# Patient Record
Sex: FEMALE | Race: WHITE
[De-identification: names, ages, dates, MRNs, and addresses within clinical notes are randomized per-mention and may not be internally consistent; named-entity substitution may affect disease eponyms.]

---

## 2018-02-01 ENCOUNTER — HOSPITAL ENCOUNTER (OUTPATIENT)
Dept: HOSPITAL 8 - CFH | Age: 63
Discharge: HOME | End: 2018-02-01
Attending: INTERNAL MEDICINE
Payer: COMMERCIAL

## 2018-02-01 DIAGNOSIS — D69.6: ICD-10-CM

## 2018-02-01 DIAGNOSIS — K50.818: ICD-10-CM

## 2018-02-01 DIAGNOSIS — B18.2: ICD-10-CM

## 2018-02-01 DIAGNOSIS — D50.0: ICD-10-CM

## 2018-02-01 DIAGNOSIS — R18.8: ICD-10-CM

## 2018-02-01 DIAGNOSIS — E03.9: ICD-10-CM

## 2018-02-01 DIAGNOSIS — I86.4: ICD-10-CM

## 2018-02-01 DIAGNOSIS — M81.0: ICD-10-CM

## 2018-02-01 DIAGNOSIS — R10.9: ICD-10-CM

## 2018-02-01 DIAGNOSIS — K80.20: ICD-10-CM

## 2018-02-01 DIAGNOSIS — E89.40: ICD-10-CM

## 2018-02-01 DIAGNOSIS — I27.20: ICD-10-CM

## 2018-02-01 DIAGNOSIS — Z86.010: ICD-10-CM

## 2018-02-01 DIAGNOSIS — Z13.820: Primary | ICD-10-CM

## 2018-02-01 DIAGNOSIS — K74.69: ICD-10-CM

## 2018-02-01 DIAGNOSIS — I85.00: ICD-10-CM

## 2018-02-01 PROCEDURE — 77080 DXA BONE DENSITY AXIAL: CPT

## 2018-02-08 ENCOUNTER — HOSPITAL ENCOUNTER (OUTPATIENT)
Dept: HOSPITAL 8 - STAR | Age: 63
Discharge: HOME | End: 2018-02-08
Attending: SURGERY
Payer: COMMERCIAL

## 2018-02-08 DIAGNOSIS — R94.31: ICD-10-CM

## 2018-02-08 DIAGNOSIS — Z01.818: Primary | ICD-10-CM

## 2018-02-08 LAB
ALBUMIN SERPL-MCNC: 3.3 G/DL (ref 3.4–5)
ALP SERPL-CCNC: 148 U/L (ref 45–117)
ALT SERPL-CCNC: 21 U/L (ref 12–78)
ANION GAP SERPL CALC-SCNC: 8 MMOL/L (ref 5–15)
BILIRUB SERPL-MCNC: 1.5 MG/DL (ref 0.2–1)
CALCIUM SERPL-MCNC: 8.4 MG/DL (ref 8.5–10.1)
CHLORIDE SERPL-SCNC: 108 MMOL/L (ref 98–107)
CREAT SERPL-MCNC: 0.79 MG/DL (ref 0.55–1.02)
PROT SERPL-MCNC: 6.4 G/DL (ref 6.4–8.2)

## 2018-02-08 PROCEDURE — 93005 ELECTROCARDIOGRAM TRACING: CPT

## 2018-02-08 PROCEDURE — 36415 COLL VENOUS BLD VENIPUNCTURE: CPT

## 2018-02-08 PROCEDURE — 80053 COMPREHEN METABOLIC PANEL: CPT

## 2018-02-14 ENCOUNTER — HOSPITAL ENCOUNTER (OUTPATIENT)
Dept: HOSPITAL 8 - OUT | Age: 63
Setting detail: OBSERVATION
LOS: 1 days | Discharge: HOME | End: 2018-02-15
Attending: SURGERY | Admitting: SURGERY
Payer: COMMERCIAL

## 2018-02-14 VITALS — WEIGHT: 185.19 LBS | BODY MASS INDEX: 31.62 KG/M2 | HEIGHT: 64 IN

## 2018-02-14 VITALS — DIASTOLIC BLOOD PRESSURE: 74 MMHG | SYSTOLIC BLOOD PRESSURE: 125 MMHG

## 2018-02-14 VITALS — SYSTOLIC BLOOD PRESSURE: 136 MMHG | DIASTOLIC BLOOD PRESSURE: 75 MMHG

## 2018-02-14 VITALS — DIASTOLIC BLOOD PRESSURE: 69 MMHG | SYSTOLIC BLOOD PRESSURE: 117 MMHG

## 2018-02-14 DIAGNOSIS — K74.60: ICD-10-CM

## 2018-02-14 DIAGNOSIS — D64.9: ICD-10-CM

## 2018-02-14 DIAGNOSIS — K80.10: Primary | ICD-10-CM

## 2018-02-14 DIAGNOSIS — I27.20: ICD-10-CM

## 2018-02-14 PROCEDURE — 88304 TISSUE EXAM BY PATHOLOGIST: CPT

## 2018-02-14 PROCEDURE — 47562 LAPAROSCOPIC CHOLECYSTECTOMY: CPT

## 2018-02-14 PROCEDURE — G0378 HOSPITAL OBSERVATION PER HR: HCPCS

## 2018-02-14 PROCEDURE — S0028 INJECTION, FAMOTIDINE, 20 MG: HCPCS

## 2018-02-14 PROCEDURE — 96374 THER/PROPH/DIAG INJ IV PUSH: CPT

## 2018-02-14 RX ADMIN — HYDROMORPHONE HYDROCHLORIDE PRN MG: 1 INJECTION, SOLUTION INTRAMUSCULAR; INTRAVENOUS; SUBCUTANEOUS at 16:48

## 2018-02-14 RX ADMIN — HYDROMORPHONE HYDROCHLORIDE PRN MG: 1 INJECTION, SOLUTION INTRAMUSCULAR; INTRAVENOUS; SUBCUTANEOUS at 16:32

## 2018-02-14 RX ADMIN — HYDROMORPHONE HYDROCHLORIDE PRN MG: 1 INJECTION, SOLUTION INTRAMUSCULAR; INTRAVENOUS; SUBCUTANEOUS at 17:10

## 2018-02-14 RX ADMIN — FENTANYL CITRATE PRN MCG: 50 INJECTION INTRAMUSCULAR; INTRAVENOUS at 16:21

## 2018-02-14 RX ADMIN — HYDROMORPHONE HYDROCHLORIDE PRN MG: 1 INJECTION, SOLUTION INTRAMUSCULAR; INTRAVENOUS; SUBCUTANEOUS at 17:19

## 2018-02-14 RX ADMIN — OXYCODONE HYDROCHLORIDE PRN MG: 5 SOLUTION ORAL at 19:32

## 2018-02-14 RX ADMIN — FENTANYL CITRATE PRN MCG: 50 INJECTION INTRAMUSCULAR; INTRAVENOUS at 16:15

## 2018-02-14 RX ADMIN — OXYCODONE HYDROCHLORIDE PRN MG: 5 SOLUTION ORAL at 23:41

## 2018-02-15 VITALS — SYSTOLIC BLOOD PRESSURE: 127 MMHG | DIASTOLIC BLOOD PRESSURE: 71 MMHG

## 2018-02-15 VITALS — DIASTOLIC BLOOD PRESSURE: 78 MMHG | SYSTOLIC BLOOD PRESSURE: 144 MMHG

## 2018-02-15 RX ADMIN — SODIUM CHLORIDE, SODIUM LACTATE, POTASSIUM CHLORIDE, AND CALCIUM CHLORIDE SCH MLS/HR: .6; .31; .03; .02 INJECTION, SOLUTION INTRAVENOUS at 08:02

## 2018-02-15 RX ADMIN — OXYCODONE HYDROCHLORIDE PRN MG: 5 SOLUTION ORAL at 14:13

## 2018-02-15 RX ADMIN — SODIUM CHLORIDE, SODIUM LACTATE, POTASSIUM CHLORIDE, AND CALCIUM CHLORIDE SCH MLS/HR: .6; .31; .03; .02 INJECTION, SOLUTION INTRAVENOUS at 00:02

## 2018-02-15 RX ADMIN — OXYCODONE HYDROCHLORIDE PRN MG: 5 SOLUTION ORAL at 10:22

## 2018-06-04 ENCOUNTER — HOSPITAL ENCOUNTER (OUTPATIENT)
Dept: RADIOLOGY | Facility: MEDICAL CENTER | Age: 63
End: 2018-06-04

## 2018-07-19 ENCOUNTER — HOSPITAL ENCOUNTER (OUTPATIENT)
Dept: HOSPITAL 8 - CFH | Age: 63
Discharge: HOME | End: 2018-07-19
Attending: INTERNAL MEDICINE
Payer: MEDICARE

## 2018-07-19 DIAGNOSIS — I10: ICD-10-CM

## 2018-07-19 DIAGNOSIS — I07.1: Primary | ICD-10-CM

## 2018-07-19 DIAGNOSIS — I27.20: ICD-10-CM

## 2018-07-19 PROCEDURE — 93306 TTE W/DOPPLER COMPLETE: CPT

## 2018-08-21 ENCOUNTER — HOSPITAL ENCOUNTER (OUTPATIENT)
Dept: LAB | Facility: MEDICAL CENTER | Age: 63
End: 2018-08-21
Attending: INTERNAL MEDICINE
Payer: MEDICARE

## 2018-08-21 LAB
BUN SERPL-MCNC: 13 MG/DL (ref 8–22)
CREAT SERPL-MCNC: 0.99 MG/DL (ref 0.5–1.4)

## 2018-08-21 PROCEDURE — 36415 COLL VENOUS BLD VENIPUNCTURE: CPT

## 2018-08-21 PROCEDURE — 82565 ASSAY OF CREATININE: CPT

## 2018-08-21 PROCEDURE — 84520 ASSAY OF UREA NITROGEN: CPT

## 2018-08-23 ENCOUNTER — HOSPITAL ENCOUNTER (OUTPATIENT)
Dept: RADIOLOGY | Facility: MEDICAL CENTER | Age: 63
End: 2018-08-23
Attending: INTERNAL MEDICINE
Payer: MEDICARE

## 2018-08-23 DIAGNOSIS — K74.60 CIRRHOSIS OF LIVER WITHOUT ASCITES, UNSPECIFIED HEPATIC CIRRHOSIS TYPE (HCC): ICD-10-CM

## 2018-08-23 DIAGNOSIS — R93.3 GASTROINTESTINAL TRACT IMAGING ABNORMALITY: ICD-10-CM

## 2018-08-23 PROCEDURE — A9585 GADOBUTROL INJECTION: HCPCS | Performed by: INTERNAL MEDICINE

## 2018-08-23 PROCEDURE — 700117 HCHG RX CONTRAST REV CODE 255: Performed by: INTERNAL MEDICINE

## 2018-08-23 PROCEDURE — 74183 MRI ABD W/O CNTR FLWD CNTR: CPT

## 2018-08-23 RX ORDER — GADOBUTROL 604.72 MG/ML
7.5 INJECTION INTRAVENOUS ONCE
Status: COMPLETED | OUTPATIENT
Start: 2018-08-23 | End: 2018-08-23

## 2018-08-23 RX ADMIN — GADOBUTROL 7.5 ML: 604.72 INJECTION INTRAVENOUS at 10:00

## 2018-11-15 ENCOUNTER — HOSPITAL ENCOUNTER (OUTPATIENT)
Dept: LAB | Facility: MEDICAL CENTER | Age: 63
End: 2018-11-15
Attending: INTERNAL MEDICINE
Payer: MEDICARE

## 2018-11-15 LAB
BUN SERPL-MCNC: 11 MG/DL (ref 8–22)
CREAT SERPL-MCNC: 0.82 MG/DL (ref 0.5–1.4)

## 2018-11-15 PROCEDURE — 84520 ASSAY OF UREA NITROGEN: CPT

## 2018-11-15 PROCEDURE — 36415 COLL VENOUS BLD VENIPUNCTURE: CPT

## 2018-11-15 PROCEDURE — 82565 ASSAY OF CREATININE: CPT

## 2018-11-26 ENCOUNTER — HOSPITAL ENCOUNTER (OUTPATIENT)
Dept: RADIOLOGY | Facility: MEDICAL CENTER | Age: 63
End: 2018-11-26
Attending: INTERNAL MEDICINE
Payer: MEDICARE

## 2018-11-26 DIAGNOSIS — R93.3 ABNORMAL UPPER GASTROINTESTINAL BARIUM SERIES: ICD-10-CM

## 2018-11-26 PROCEDURE — 700117 HCHG RX CONTRAST REV CODE 255: Performed by: INTERNAL MEDICINE

## 2018-11-26 PROCEDURE — A9585 GADOBUTROL INJECTION: HCPCS | Performed by: INTERNAL MEDICINE

## 2018-11-26 PROCEDURE — 74183 MRI ABD W/O CNTR FLWD CNTR: CPT

## 2018-11-26 RX ORDER — GADOBUTROL 604.72 MG/ML
9 INJECTION INTRAVENOUS ONCE
Status: COMPLETED | OUTPATIENT
Start: 2018-11-26 | End: 2018-11-26

## 2018-11-26 RX ADMIN — GADOBUTROL 9 ML: 604.72 INJECTION INTRAVENOUS at 09:11

## 2019-01-10 ENCOUNTER — TELEPHONE (OUTPATIENT)
Dept: TRANSPLANT | Facility: MEDICAL CENTER | Age: 64
End: 2019-01-10

## 2019-01-10 ENCOUNTER — HOSPITAL ENCOUNTER (OUTPATIENT)
Dept: LAB | Facility: MEDICAL CENTER | Age: 64
End: 2019-01-10
Attending: INTERNAL MEDICINE
Payer: MEDICARE

## 2019-01-10 DIAGNOSIS — B18.2 CHRONIC HEPATITIS C WITHOUT HEPATIC COMA (HCC): ICD-10-CM

## 2019-01-10 DIAGNOSIS — B18.2 CHRONIC HEPATITIS C WITHOUT HEPATIC COMA (HCC): Primary | ICD-10-CM

## 2019-01-10 LAB
ALBUMIN SERPL BCP-MCNC: 3.7 G/DL (ref 3.2–4.9)
ALBUMIN/GLOB SERPL: 1.7 G/DL
ALP SERPL-CCNC: 95 U/L (ref 30–99)
ALT SERPL-CCNC: 22 U/L (ref 2–50)
ANION GAP SERPL CALC-SCNC: 8 MMOL/L (ref 0–11.9)
AST SERPL-CCNC: 27 U/L (ref 12–45)
BASOPHILS # BLD AUTO: 0.9 % (ref 0–1.8)
BASOPHILS # BLD: 0.03 K/UL (ref 0–0.12)
BILIRUB SERPL-MCNC: 2.7 MG/DL (ref 0.1–1.5)
BUN SERPL-MCNC: 6 MG/DL (ref 8–22)
CALCIUM SERPL-MCNC: 9.3 MG/DL (ref 8.5–10.5)
CHLORIDE SERPL-SCNC: 111 MMOL/L (ref 96–112)
CO2 SERPL-SCNC: 24 MMOL/L (ref 20–33)
CREAT SERPL-MCNC: 0.72 MG/DL (ref 0.5–1.4)
EOSINOPHIL # BLD AUTO: 0.03 K/UL (ref 0–0.51)
EOSINOPHIL NFR BLD: 0.9 % (ref 0–6.9)
ERYTHROCYTE [DISTWIDTH] IN BLOOD BY AUTOMATED COUNT: 45.1 FL (ref 35.9–50)
GLOBULIN SER CALC-MCNC: 2.2 G/DL (ref 1.9–3.5)
GLUCOSE SERPL-MCNC: 101 MG/DL (ref 65–99)
HCT VFR BLD AUTO: 41.6 % (ref 37–47)
HGB BLD-MCNC: 14 G/DL (ref 12–16)
IMM GRANULOCYTES # BLD AUTO: 0.01 K/UL (ref 0–0.11)
IMM GRANULOCYTES NFR BLD AUTO: 0.3 % (ref 0–0.9)
INR PPP: 1.65 (ref 0.87–1.13)
LYMPHOCYTES # BLD AUTO: 0.41 K/UL (ref 1–4.8)
LYMPHOCYTES NFR BLD: 11.7 % (ref 22–41)
MCH RBC QN AUTO: 29.7 PG (ref 27–33)
MCHC RBC AUTO-ENTMCNC: 33.7 G/DL (ref 33.6–35)
MCV RBC AUTO: 88.3 FL (ref 81.4–97.8)
MONOCYTES # BLD AUTO: 0.36 K/UL (ref 0–0.85)
MONOCYTES NFR BLD AUTO: 10.3 % (ref 0–13.4)
NEUTROPHILS # BLD AUTO: 2.67 K/UL (ref 2–7.15)
NEUTROPHILS NFR BLD: 75.9 % (ref 44–72)
NRBC # BLD AUTO: 0 K/UL
NRBC BLD-RTO: 0 /100 WBC
PLATELET # BLD AUTO: 59 K/UL (ref 164–446)
PMV BLD AUTO: 12.9 FL (ref 9–12.9)
POTASSIUM SERPL-SCNC: 3.5 MMOL/L (ref 3.6–5.5)
PROT SERPL-MCNC: 5.9 G/DL (ref 6–8.2)
PROTHROMBIN TIME: 19.6 SEC (ref 12–14.6)
RBC # BLD AUTO: 4.71 M/UL (ref 4.2–5.4)
SODIUM SERPL-SCNC: 143 MMOL/L (ref 135–145)
WBC # BLD AUTO: 3.5 K/UL (ref 4.8–10.8)

## 2019-01-10 PROCEDURE — 85025 COMPLETE CBC W/AUTO DIFF WBC: CPT

## 2019-01-10 PROCEDURE — 36415 COLL VENOUS BLD VENIPUNCTURE: CPT

## 2019-01-10 PROCEDURE — 82105 ALPHA-FETOPROTEIN SERUM: CPT

## 2019-01-10 PROCEDURE — 85610 PROTHROMBIN TIME: CPT

## 2019-01-10 PROCEDURE — 80053 COMPREHEN METABOLIC PANEL: CPT

## 2019-01-10 PROCEDURE — 80307 DRUG TEST PRSMV CHEM ANLYZR: CPT | Mod: GA

## 2019-01-10 NOTE — TELEPHONE ENCOUNTER
Called patient to discuss recent follow-up visits with community providers. Records requested from [facility] regarding recent notes and testing not done at [facility]. Records received from April with [facility] via fax.

## 2019-01-12 LAB
AMPHETAMINES UR QL: NEGATIVE NG/ML
BARBITURATES UR QL: NEGATIVE NG/ML
BENZODIAZ UR QL: NEGATIVE NG/ML
CANNABINOIDS UR QL SCN: NEGATIVE NG/ML
COCAINE UR QL: NEGATIVE NG/ML
DRUG SCREEN COMMENT UR-IMP: NORMAL
MDMA CTO UR SCN-MCNC: NEGATIVE NG/ML
METHADONE UR QL: NEGATIVE NG/ML
OPIATES UR QL: NEGATIVE NG/ML
OXYCODONE CTO UR SCN-MCNC: NEGATIVE NG/ML
PCP UR QL SCN: NEGATIVE NG/ML
PROPOXYPH UR QL: NEGATIVE NG/ML

## 2019-01-13 LAB — AFP-TM SERPL-MCNC: 4 NG/ML (ref 0–9)

## 2019-01-16 ENCOUNTER — OFFICE VISIT (OUTPATIENT)
Dept: TRANSPLANT | Facility: MEDICAL CENTER | Age: 64
End: 2019-01-16
Payer: MEDICARE

## 2019-01-16 VITALS
HEART RATE: 64 BPM | WEIGHT: 165 LBS | SYSTOLIC BLOOD PRESSURE: 112 MMHG | RESPIRATION RATE: 16 BRPM | OXYGEN SATURATION: 97 % | TEMPERATURE: 98.6 F | BODY MASS INDEX: 28.17 KG/M2 | DIASTOLIC BLOOD PRESSURE: 58 MMHG | HEIGHT: 64 IN

## 2019-01-16 DIAGNOSIS — K52.9 IBD (INFLAMMATORY BOWEL DISEASE): ICD-10-CM

## 2019-01-16 DIAGNOSIS — I27.20 PORTOPULMONARY HYPERTENSION (HCC): ICD-10-CM

## 2019-01-16 DIAGNOSIS — K76.6 PORTOPULMONARY HYPERTENSION (HCC): ICD-10-CM

## 2019-01-16 DIAGNOSIS — K74.60 CIRRHOSIS OF LIVER WITHOUT ASCITES, UNSPECIFIED HEPATIC CIRRHOSIS TYPE (HCC): ICD-10-CM

## 2019-01-16 DIAGNOSIS — D69.6 THROMBOCYTOPENIA (HCC): ICD-10-CM

## 2019-01-16 PROCEDURE — 99214 OFFICE O/P EST MOD 30 MIN: CPT | Performed by: INTERNAL MEDICINE

## 2019-01-16 RX ORDER — POTASSIUM CITRATE 10 MEQ/1
20 TABLET, EXTENDED RELEASE ORAL DAILY
COMMUNITY
End: 2019-06-23

## 2019-01-16 RX ORDER — FUROSEMIDE 80 MG
80 TABLET ORAL DAILY
COMMUNITY
End: 2019-06-23

## 2019-01-16 NOTE — PROGRESS NOTES
"  Pt seen in f/u in liver transplant clinic for f/u. She was accompanied by her .  She has a hx of PH dx around , cirrhosis due to HCV (risk is blood transfusion) with SVR, and liver mass, initially concerned to be c/w HCC, but remains a Lirads3 lesion    Currently in deferral for ongoing w/u of her PH and determination if she is a candidate based on her PH    RHC:   PMHx  dx'd with PH in  - on macitentan since   Hx of HCV - SVR from Harvoni   HCV cirrhosis  Liver mass - lirads 3  Edema requires diuretics   miscarriage  Reports hx of \"crohn's disease\" in  - on Lialda - last Milmine by her report 3 ya  Hyperthyroid with goiter s/p radiation - now hypothyroid           PSHx   Lap choly 2018  Appendicitis  Hysterectomy 1993 for fibroids (Open)     Soc:  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  No hospitalizations, no GIB  Working 20hrs per week  Had been obese, up to 180lbs now 165    Vitals:    19 1118   BP: 112/58   Pulse: 64   Resp: 16   Temp: 37 °C (98.6 °F)   SpO2: 97%     GEN: chronically ill appearing, NAD, no icterus  HEENT: OP clear, no cervical DELTA, no thyromegally  Pulm:  Lungs Clear to auscultation BL  Heart:  RRR, no murmur, rubs or gallops  Abd: Liver edge felt at RCM firm, +splenomegally, no ascites  Ext: no edema  Skin:  No palmar erythema, no spider angiomata  Lower ext: no edema  Pscyh: normal mood and affect  Neuro: Alert and oriented x 3, normal speech, normal gait and station    Results for MAXIMINO TOPETE (MRN [id number]) as of 2019 16:51   Ref. Range 1/10/2019 15:02   WBC Latest Ref Range: 4.8 - 10.8 K/uL 3.5 (L)   RBC Latest Ref Range: 4.20 - 5.40 " M/uL 4.71   Hemoglobin Latest Ref Range: 12.0 - 16.0 g/dL 14.0   Hematocrit Latest Ref Range: 37.0 - 47.0 % 41.6   MCV Latest Ref Range: 81.4 - 97.8 fL 88.3   MCH Latest Ref Range: 27.0 - 33.0 pg 29.7   MCHC Latest Ref Range: 33.6 - 35.0 g/dL 33.7   RDW Latest Ref Range: 35.9 - 50.0 fL 45.1   Platelet Count Latest Ref Range: 164 - 446 K/uL 59 (L)   Results for MAXIMINO TOPETE (MRN [id number]) as of 1/16/2019 16:51   Ref. Range 1/10/2019 15:02   Sodium Latest Ref Range: 135 - 145 mmol/L 143   Potassium Latest Ref Range: 3.6 - 5.5 mmol/L 3.5 (L)   Chloride Latest Ref Range: 96 - 112 mmol/L 111   Co2 Latest Ref Range: 20 - 33 mmol/L 24   Anion Gap Latest Ref Range: 0.0 - 11.9  8.0   Glucose Latest Ref Range: 65 - 99 mg/dL 101 (H)   Bun Latest Ref Range: 8 - 22 mg/dL 6 (L)   Creatinine Latest Ref Range: 0.50 - 1.40 mg/dL 0.72   GFR If  Latest Ref Range: >60 mL/min/1.73 m 2 >60   GFR If Non  Latest Ref Range: >60 mL/min/1.73 m 2 >60   Calcium Latest Ref Range: 8.5 - 10.5 mg/dL 9.3   AST(SGOT) Latest Ref Range: 12 - 45 U/L 27   ALT(SGPT) Latest Ref Range: 2 - 50 U/L 22   Alkaline Phosphatase Latest Ref Range: 30 - 99 U/L 95   Total Bilirubin Latest Ref Range: 0.1 - 1.5 mg/dL 2.7 (H)   Albumin Latest Ref Range: 3.2 - 4.9 g/dL 3.7   Total Protein Latest Ref Range: 6.0 - 8.2 g/dL 5.9 (L)   Globulin Latest Ref Range: 1.9 - 3.5 g/dL 2.2   A-G Ratio Latest Units: g/dL 1.7     11/26/18 Impression MRI with contrast      1.  Stable 11 mm nonenhancing slightly diffusion intense mass within hepatic segment IVb    2.  Previously identified hepatic 6 lesion not confirmed and there is a cyst at the same location    3.  Cirrhosis with portal hypertension. Associated marked splenomegaly and dilation the portal vein    4.  Prior cholecystectomy. Mild, stable biliary dilation    5.  Pancreas divisum       RHC 10/11/18:   CO 4.53/ CI 2.82    PA 47/13 MPAP 30  PCWP 7  mRAP 4    ECHO  8/20/18  Interpretation Summary  Contrast injection was performed.  1) Bubble study performed at rest and with Valsalva is negative for intra-cardiac and intra-pulmonary shunts.  Moderately dilated RV with mild systolic dysfunction. RVFAC=29%. Moderate-moderate/severe TR. RVSP= 66mmHg.  RAP=5mmHg. Mild PA and PA branch dilatation. Mild RAOUL.  2) Normal LV size and systolic function. LVEF by MOD= 65%. Mild LVH. Mild MR.  3) No prior exam for comparison.    Impression:   HCV cirrhosis, compensated, with PH, since 2000, presumably in part due to portal HTN.   Her PH has been controlled for years, and when pt had a new liver mass, concerning for HCC she was referred for Eval. The lesion in question has been stable and is not likely an HCC.   Although pt does meet minimal listing criteria her PH places her at high risk for OLT. Additionally, it is concerning that her CO/CI has decreased since her last RHC 8 years ago.   Pt expresses wish to avoid procedures with high risk, if possible.     REC:  Have Dr. Payan of PH at [facility] d/w Dr. Gadiel Rosa ([address] [facility]) local cardiologist treating PH - concern is decreased CO/CI in pt undergoing OLT  - pt would like to avoid having to travel to [place].   Continue f/u with local GI for ongoing HCC screening and management  No medication changes    Pt remains in deferral    Lacey De La Vega

## 2019-01-17 NOTE — NON-PROVIDER
Pt seen in clinic. History and current medications reviewed. Vital signs assessed. Seen by MD. Recommendations noted.

## 2019-02-06 ENCOUNTER — TELEPHONE (OUTPATIENT)
Dept: TRANSPLANT | Facility: MEDICAL CENTER | Age: 64
End: 2019-02-06

## 2019-02-06 NOTE — TELEPHONE ENCOUNTER
Called patient to followup with current recommendations made by Dr. Payan. Pt states that she spoke with her yesterday and understands recommendations. Pt to followup with RN with any medication changes or procedures after they are done. Pt verbalized understanding.

## 2019-06-23 ENCOUNTER — APPOINTMENT (OUTPATIENT)
Dept: RADIOLOGY | Facility: MEDICAL CENTER | Age: 64
End: 2019-06-23
Attending: EMERGENCY MEDICINE
Payer: MEDICARE

## 2019-06-23 ENCOUNTER — HOSPITAL ENCOUNTER (OUTPATIENT)
Facility: MEDICAL CENTER | Age: 64
End: 2019-06-27
Attending: EMERGENCY MEDICINE | Admitting: INTERNAL MEDICINE
Payer: MEDICARE

## 2019-06-23 DIAGNOSIS — E03.9 HYPOTHYROIDISM, UNSPECIFIED TYPE: ICD-10-CM

## 2019-06-23 DIAGNOSIS — G47.00 INSOMNIA, UNSPECIFIED TYPE: ICD-10-CM

## 2019-06-23 DIAGNOSIS — F29 PSYCHOSIS, UNSPECIFIED PSYCHOSIS TYPE (HCC): ICD-10-CM

## 2019-06-23 PROBLEM — D72.819 LEUKOPENIA: Status: ACTIVE | Noted: 2019-06-23

## 2019-06-23 PROBLEM — R17 SERUM TOTAL BILIRUBIN ELEVATED: Status: ACTIVE | Noted: 2019-06-23

## 2019-06-23 PROBLEM — D69.6 THROMBOCYTOPENIA (HCC): Status: ACTIVE | Noted: 2019-06-23

## 2019-06-23 PROBLEM — R44.3 HALLUCINATIONS: Status: ACTIVE | Noted: 2019-06-23

## 2019-06-23 PROBLEM — E87.6 HYPOKALEMIA: Status: ACTIVE | Noted: 2019-06-23

## 2019-06-23 LAB
ALBUMIN SERPL BCP-MCNC: 3.8 G/DL (ref 3.2–4.9)
ALBUMIN/GLOB SERPL: 1.9 G/DL
ALP SERPL-CCNC: 99 U/L (ref 30–99)
ALT SERPL-CCNC: 22 U/L (ref 2–50)
AMMONIA PLAS-SCNC: 60 UMOL/L (ref 11–45)
AMPHET UR QL SCN: NEGATIVE
ANION GAP SERPL CALC-SCNC: 9 MMOL/L (ref 0–11.9)
APPEARANCE UR: ABNORMAL
AST SERPL-CCNC: 31 U/L (ref 12–45)
BACTERIA #/AREA URNS HPF: NEGATIVE /HPF
BARBITURATES UR QL SCN: NEGATIVE
BASOPHILS # BLD AUTO: 0.8 % (ref 0–1.8)
BASOPHILS # BLD: 0.03 K/UL (ref 0–0.12)
BENZODIAZ UR QL SCN: NEGATIVE
BILIRUB SERPL-MCNC: 1.9 MG/DL (ref 0.1–1.5)
BILIRUB UR QL STRIP.AUTO: NEGATIVE
BUN SERPL-MCNC: 10 MG/DL (ref 8–22)
BZE UR QL SCN: NEGATIVE
CALCIUM SERPL-MCNC: 9.5 MG/DL (ref 8.5–10.5)
CANNABINOIDS UR QL SCN: NEGATIVE
CAOX CRY #/AREA URNS HPF: NORMAL /HPF
CHLORIDE SERPL-SCNC: 112 MMOL/L (ref 96–112)
CO2 SERPL-SCNC: 23 MMOL/L (ref 20–33)
COLOR UR: YELLOW
CREAT SERPL-MCNC: 0.71 MG/DL (ref 0.5–1.4)
EOSINOPHIL # BLD AUTO: 0.06 K/UL (ref 0–0.51)
EOSINOPHIL NFR BLD: 1.6 % (ref 0–6.9)
EPI CELLS #/AREA URNS HPF: NEGATIVE /HPF
ERYTHROCYTE [DISTWIDTH] IN BLOOD BY AUTOMATED COUNT: 52.9 FL (ref 35.9–50)
ETHANOL BLD-MCNC: 0 G/DL
GLOBULIN SER CALC-MCNC: 2 G/DL (ref 1.9–3.5)
GLUCOSE SERPL-MCNC: 94 MG/DL (ref 65–99)
GLUCOSE UR STRIP.AUTO-MCNC: NEGATIVE MG/DL
HCT VFR BLD AUTO: 39.5 % (ref 37–47)
HGB BLD-MCNC: 12.3 G/DL (ref 12–16)
HYALINE CASTS #/AREA URNS LPF: NORMAL /LPF
IMM GRANULOCYTES # BLD AUTO: 0.01 K/UL (ref 0–0.11)
IMM GRANULOCYTES NFR BLD AUTO: 0.3 % (ref 0–0.9)
KETONES UR STRIP.AUTO-MCNC: NEGATIVE MG/DL
LEUKOCYTE ESTERASE UR QL STRIP.AUTO: ABNORMAL
LYMPHOCYTES # BLD AUTO: 0.59 K/UL (ref 1–4.8)
LYMPHOCYTES NFR BLD: 16 % (ref 22–41)
MCH RBC QN AUTO: 26.3 PG (ref 27–33)
MCHC RBC AUTO-ENTMCNC: 31.1 G/DL (ref 33.6–35)
MCV RBC AUTO: 84.6 FL (ref 81.4–97.8)
METHADONE UR QL SCN: NEGATIVE
MICRO URNS: ABNORMAL
MONOCYTES # BLD AUTO: 0.34 K/UL (ref 0–0.85)
MONOCYTES NFR BLD AUTO: 9.2 % (ref 0–13.4)
NEUTROPHILS # BLD AUTO: 2.66 K/UL (ref 2–7.15)
NEUTROPHILS NFR BLD: 72.1 % (ref 44–72)
NITRITE UR QL STRIP.AUTO: NEGATIVE
NRBC # BLD AUTO: 0 K/UL
NRBC BLD-RTO: 0 /100 WBC
OPIATES UR QL SCN: NEGATIVE
OXYCODONE UR QL SCN: NEGATIVE
PCP UR QL SCN: NEGATIVE
PH UR STRIP.AUTO: 5.5 [PH]
PLATELET # BLD AUTO: 57 K/UL (ref 164–446)
PMV BLD AUTO: 10.8 FL (ref 9–12.9)
POTASSIUM SERPL-SCNC: 3.4 MMOL/L (ref 3.6–5.5)
PROPOXYPH UR QL SCN: NEGATIVE
PROT SERPL-MCNC: 5.8 G/DL (ref 6–8.2)
PROT UR QL STRIP: NEGATIVE MG/DL
RBC # BLD AUTO: 4.67 M/UL (ref 4.2–5.4)
RBC # URNS HPF: NORMAL /HPF
RBC UR QL AUTO: NEGATIVE
SODIUM SERPL-SCNC: 144 MMOL/L (ref 135–145)
SP GR UR STRIP.AUTO: 1.02
TSH SERPL DL<=0.005 MIU/L-ACNC: 20.95 UIU/ML (ref 0.38–5.33)
UROBILINOGEN UR STRIP.AUTO-MCNC: 1 MG/DL
WBC # BLD AUTO: 3.7 K/UL (ref 4.8–10.8)
WBC #/AREA URNS HPF: NORMAL /HPF

## 2019-06-23 PROCEDURE — 84443 ASSAY THYROID STIM HORMONE: CPT

## 2019-06-23 PROCEDURE — 71045 X-RAY EXAM CHEST 1 VIEW: CPT

## 2019-06-23 PROCEDURE — A9270 NON-COVERED ITEM OR SERVICE: HCPCS | Performed by: INTERNAL MEDICINE

## 2019-06-23 PROCEDURE — 80307 DRUG TEST PRSMV CHEM ANLYZR: CPT

## 2019-06-23 PROCEDURE — G0378 HOSPITAL OBSERVATION PER HR: HCPCS

## 2019-06-23 PROCEDURE — 85025 COMPLETE CBC W/AUTO DIFF WBC: CPT

## 2019-06-23 PROCEDURE — 82140 ASSAY OF AMMONIA: CPT

## 2019-06-23 PROCEDURE — 93005 ELECTROCARDIOGRAM TRACING: CPT | Performed by: EMERGENCY MEDICINE

## 2019-06-23 PROCEDURE — 700102 HCHG RX REV CODE 250 W/ 637 OVERRIDE(OP): Performed by: EMERGENCY MEDICINE

## 2019-06-23 PROCEDURE — 99285 EMERGENCY DEPT VISIT HI MDM: CPT

## 2019-06-23 PROCEDURE — 81001 URINALYSIS AUTO W/SCOPE: CPT | Mod: XU

## 2019-06-23 PROCEDURE — 700102 HCHG RX REV CODE 250 W/ 637 OVERRIDE(OP): Performed by: INTERNAL MEDICINE

## 2019-06-23 PROCEDURE — 99220 PR INITIAL OBSERVATION CARE,LEVL III: CPT | Performed by: INTERNAL MEDICINE

## 2019-06-23 PROCEDURE — 80053 COMPREHEN METABOLIC PANEL: CPT

## 2019-06-23 PROCEDURE — A9270 NON-COVERED ITEM OR SERVICE: HCPCS | Performed by: EMERGENCY MEDICINE

## 2019-06-23 PROCEDURE — 70450 CT HEAD/BRAIN W/O DYE: CPT

## 2019-06-23 RX ORDER — TRAZODONE HYDROCHLORIDE 50 MG/1
50 TABLET ORAL
Status: DISCONTINUED | OUTPATIENT
Start: 2019-06-23 | End: 2019-06-24

## 2019-06-23 RX ORDER — LEVOTHYROXINE SODIUM 0.12 MG/1
125 TABLET ORAL
Status: DISCONTINUED | OUTPATIENT
Start: 2019-06-24 | End: 2019-06-27 | Stop reason: HOSPADM

## 2019-06-23 RX ORDER — ONDANSETRON 2 MG/ML
4 INJECTION INTRAMUSCULAR; INTRAVENOUS EVERY 4 HOURS PRN
Status: DISCONTINUED | OUTPATIENT
Start: 2019-06-23 | End: 2019-06-27 | Stop reason: HOSPADM

## 2019-06-23 RX ORDER — AMOXICILLIN 250 MG
2 CAPSULE ORAL 2 TIMES DAILY
Status: DISCONTINUED | OUTPATIENT
Start: 2019-06-23 | End: 2019-06-27 | Stop reason: HOSPADM

## 2019-06-23 RX ORDER — LEVOTHYROXINE SODIUM 0.03 MG/1
25 TABLET ORAL
COMMUNITY

## 2019-06-23 RX ORDER — ENALAPRILAT 1.25 MG/ML
1.25 INJECTION INTRAVENOUS EVERY 6 HOURS PRN
Status: DISCONTINUED | OUTPATIENT
Start: 2019-06-23 | End: 2019-06-27 | Stop reason: HOSPADM

## 2019-06-23 RX ORDER — POTASSIUM CHLORIDE 20 MEQ/1
40 TABLET, EXTENDED RELEASE ORAL 2 TIMES DAILY
Status: COMPLETED | OUTPATIENT
Start: 2019-06-23 | End: 2019-06-25

## 2019-06-23 RX ORDER — POLYETHYLENE GLYCOL 3350 17 G/17G
1 POWDER, FOR SOLUTION ORAL
Status: DISCONTINUED | OUTPATIENT
Start: 2019-06-23 | End: 2019-06-27 | Stop reason: HOSPADM

## 2019-06-23 RX ORDER — BISACODYL 10 MG
10 SUPPOSITORY, RECTAL RECTAL
Status: DISCONTINUED | OUTPATIENT
Start: 2019-06-23 | End: 2019-06-27 | Stop reason: HOSPADM

## 2019-06-23 RX ORDER — HYDROXYZINE PAMOATE 50 MG/1
50 CAPSULE ORAL EVERY 4 HOURS PRN
Status: ON HOLD | COMMUNITY
End: 2019-06-27

## 2019-06-23 RX ORDER — BISACODYL 10 MG
10 SUPPOSITORY, RECTAL RECTAL
COMMUNITY

## 2019-06-23 RX ORDER — QUETIAPINE FUMARATE 25 MG/1
25 TABLET, FILM COATED ORAL
Status: DISCONTINUED | OUTPATIENT
Start: 2019-06-23 | End: 2019-06-24

## 2019-06-23 RX ORDER — QUETIAPINE FUMARATE 25 MG/1
25 TABLET, FILM COATED ORAL
Status: ON HOLD | COMMUNITY
End: 2019-06-27

## 2019-06-23 RX ORDER — CLONIDINE HYDROCHLORIDE 0.1 MG/1
0.1 TABLET ORAL EVERY 6 HOURS PRN
COMMUNITY

## 2019-06-23 RX ORDER — LEVOTHYROXINE SODIUM 0.2 MG/1
200 TABLET ORAL ONCE
Status: COMPLETED | OUTPATIENT
Start: 2019-06-23 | End: 2019-06-23

## 2019-06-23 RX ORDER — ACETAMINOPHEN 325 MG/1
650 TABLET ORAL EVERY 6 HOURS PRN
Status: DISCONTINUED | OUTPATIENT
Start: 2019-06-23 | End: 2019-06-27 | Stop reason: HOSPADM

## 2019-06-23 RX ORDER — ONDANSETRON 4 MG/1
4 TABLET, ORALLY DISINTEGRATING ORAL EVERY 4 HOURS PRN
Status: DISCONTINUED | OUTPATIENT
Start: 2019-06-23 | End: 2019-06-27 | Stop reason: HOSPADM

## 2019-06-23 RX ORDER — LEVOTHYROXINE SODIUM 0.03 MG/1
25 TABLET ORAL
Status: DISCONTINUED | OUTPATIENT
Start: 2019-06-24 | End: 2019-06-23

## 2019-06-23 RX ORDER — IBUPROFEN 400 MG/1
400 TABLET ORAL EVERY 6 HOURS PRN
COMMUNITY

## 2019-06-23 RX ORDER — LEVOTHYROXINE SODIUM 0.1 MG/1
100 TABLET ORAL
COMMUNITY

## 2019-06-23 RX ORDER — TRAZODONE HYDROCHLORIDE 50 MG/1
50 TABLET ORAL
Status: ON HOLD | COMMUNITY
End: 2019-06-27

## 2019-06-23 RX ADMIN — QUETIAPINE FUMARATE 25 MG: 25 TABLET ORAL at 20:35

## 2019-06-23 RX ADMIN — POTASSIUM CHLORIDE 40 MEQ: 1500 TABLET, EXTENDED RELEASE ORAL at 21:50

## 2019-06-23 RX ADMIN — LEVOTHYROXINE SODIUM 200 MCG: 200 TABLET ORAL at 18:45

## 2019-06-23 ASSESSMENT — COGNITIVE AND FUNCTIONAL STATUS - GENERAL
CLIMB 3 TO 5 STEPS WITH RAILING: A LITTLE
SUGGESTED CMS G CODE MODIFIER DAILY ACTIVITY: CH
DAILY ACTIVITIY SCORE: 24
SUGGESTED CMS G CODE MODIFIER MOBILITY: CI
MOBILITY SCORE: 23

## 2019-06-23 ASSESSMENT — ENCOUNTER SYMPTOMS
PALPITATIONS: 0
LOSS OF CONSCIOUSNESS: 0
DEPRESSION: 0
FALLS: 0
DIARRHEA: 0
ABDOMINAL PAIN: 0
WEAKNESS: 0
CHILLS: 0
FEVER: 0
SHORTNESS OF BREATH: 0
HEADACHES: 0
CONSTIPATION: 0
SPUTUM PRODUCTION: 0
VOMITING: 0
MYALGIAS: 0
TINGLING: 0
COUGH: 0
STRIDOR: 0
DIZZINESS: 0
NAUSEA: 0

## 2019-06-23 ASSESSMENT — LIFESTYLE VARIABLES
EVER_SMOKED: NEVER
ALCOHOL_USE: NO

## 2019-06-23 ASSESSMENT — PATIENT HEALTH QUESTIONNAIRE - PHQ9
SUM OF ALL RESPONSES TO PHQ9 QUESTIONS 1 AND 2: 0
2. FEELING DOWN, DEPRESSED, IRRITABLE, OR HOPELESS: NOT AT ALL
1. LITTLE INTEREST OR PLEASURE IN DOING THINGS: NOT AT ALL

## 2019-06-23 NOTE — ED TRIAGE NOTES
Chief Complaint   Patient presents with   • Medical Clearance     from [facility]   • Unable to Sleep     [id number]     Pt bib ems from [facility] , here for medical clearance . Per report, pt only gets 1-2hours sleep a night [id number]. Pt experiencing hallucinations/psychosis.

## 2019-06-24 PROBLEM — K74.60 LIVER CIRRHOSIS (HCC): Status: ACTIVE | Noted: 2019-06-24

## 2019-06-24 PROBLEM — D61.818 PANCYTOPENIA (HCC): Status: ACTIVE | Noted: 2019-06-23

## 2019-06-24 PROBLEM — F32.3 SEVERE MAJOR DEPRESSION WITH PSYCHOTIC FEATURES (HCC): Status: ACTIVE | Noted: 2019-06-24

## 2019-06-24 LAB
ANION GAP SERPL CALC-SCNC: 6 MMOL/L (ref 0–11.9)
BUN SERPL-MCNC: 9 MG/DL (ref 8–22)
CALCIUM SERPL-MCNC: 8.5 MG/DL (ref 8.5–10.5)
CHLORIDE SERPL-SCNC: 115 MMOL/L (ref 96–112)
CO2 SERPL-SCNC: 23 MMOL/L (ref 20–33)
CREAT SERPL-MCNC: 0.66 MG/DL (ref 0.5–1.4)
ERYTHROCYTE [DISTWIDTH] IN BLOOD BY AUTOMATED COUNT: 50.6 FL (ref 35.9–50)
GLUCOSE SERPL-MCNC: 78 MG/DL (ref 65–99)
HCT VFR BLD AUTO: 34.6 % (ref 37–47)
HGB BLD-MCNC: 11.2 G/DL (ref 12–16)
MAGNESIUM SERPL-MCNC: 1.5 MG/DL (ref 1.5–2.5)
MCH RBC QN AUTO: 26.6 PG (ref 27–33)
MCHC RBC AUTO-ENTMCNC: 32.4 G/DL (ref 33.6–35)
MCV RBC AUTO: 82.2 FL (ref 81.4–97.8)
MORPHOLOGY BLD-IMP: NORMAL
PLATELET # BLD AUTO: 42 K/UL (ref 164–446)
PLATELETS.RETICULATED NFR BLD AUTO: 6.8 K/UL (ref 0.6–13.1)
POTASSIUM SERPL-SCNC: 3.5 MMOL/L (ref 3.6–5.5)
RBC # BLD AUTO: 4.21 M/UL (ref 4.2–5.4)
SODIUM SERPL-SCNC: 144 MMOL/L (ref 135–145)
WBC # BLD AUTO: 1.9 K/UL (ref 4.8–10.8)

## 2019-06-24 PROCEDURE — 99226 PR SUBSEQUENT OBSERVATION CARE,LEVEL III: CPT | Performed by: INTERNAL MEDICINE

## 2019-06-24 PROCEDURE — 85027 COMPLETE CBC AUTOMATED: CPT

## 2019-06-24 PROCEDURE — 83735 ASSAY OF MAGNESIUM: CPT

## 2019-06-24 PROCEDURE — A9270 NON-COVERED ITEM OR SERVICE: HCPCS | Performed by: PSYCHIATRY & NEUROLOGY

## 2019-06-24 PROCEDURE — 85055 RETICULATED PLATELET ASSAY: CPT

## 2019-06-24 PROCEDURE — 700102 HCHG RX REV CODE 250 W/ 637 OVERRIDE(OP): Performed by: INTERNAL MEDICINE

## 2019-06-24 PROCEDURE — A9270 NON-COVERED ITEM OR SERVICE: HCPCS | Performed by: INTERNAL MEDICINE

## 2019-06-24 PROCEDURE — 96372 THER/PROPH/DIAG INJ SC/IM: CPT

## 2019-06-24 PROCEDURE — 700102 HCHG RX REV CODE 250 W/ 637 OVERRIDE(OP): Performed by: PSYCHIATRY & NEUROLOGY

## 2019-06-24 PROCEDURE — 80048 BASIC METABOLIC PNL TOTAL CA: CPT

## 2019-06-24 PROCEDURE — 36415 COLL VENOUS BLD VENIPUNCTURE: CPT

## 2019-06-24 PROCEDURE — 700111 HCHG RX REV CODE 636 W/ 250 OVERRIDE (IP): Performed by: INTERNAL MEDICINE

## 2019-06-24 PROCEDURE — 99215 OFFICE O/P EST HI 40 MIN: CPT | Performed by: PSYCHIATRY & NEUROLOGY

## 2019-06-24 PROCEDURE — G0378 HOSPITAL OBSERVATION PER HR: HCPCS

## 2019-06-24 RX ORDER — ARIPIPRAZOLE 10 MG/1
5 TABLET ORAL
Status: DISCONTINUED | OUTPATIENT
Start: 2019-06-24 | End: 2019-06-26

## 2019-06-24 RX ORDER — QUETIAPINE FUMARATE 25 MG/1
50 TABLET, FILM COATED ORAL EVERY EVENING
Status: DISCONTINUED | OUTPATIENT
Start: 2019-06-24 | End: 2019-06-24

## 2019-06-24 RX ORDER — MIRTAZAPINE 15 MG/1
7.5 TABLET, FILM COATED ORAL
Status: DISCONTINUED | OUTPATIENT
Start: 2019-06-24 | End: 2019-06-27 | Stop reason: HOSPADM

## 2019-06-24 RX ORDER — QUETIAPINE FUMARATE 100 MG/1
100 TABLET, FILM COATED ORAL EVERY EVENING
Status: DISCONTINUED | OUTPATIENT
Start: 2019-06-24 | End: 2019-06-24

## 2019-06-24 RX ORDER — QUETIAPINE FUMARATE 25 MG/1
25 TABLET, FILM COATED ORAL 2 TIMES DAILY
Status: DISCONTINUED | OUTPATIENT
Start: 2019-06-24 | End: 2019-06-24

## 2019-06-24 RX ADMIN — SENNOSIDES, DOCUSATE SODIUM 2 TABLET: 50; 8.6 TABLET, FILM COATED ORAL at 06:21

## 2019-06-24 RX ADMIN — POTASSIUM CHLORIDE 40 MEQ: 1500 TABLET, EXTENDED RELEASE ORAL at 17:59

## 2019-06-24 RX ADMIN — ENOXAPARIN SODIUM 40 MG: 100 INJECTION SUBCUTANEOUS at 06:21

## 2019-06-24 RX ADMIN — ARIPIPRAZOLE 5 MG: 10 TABLET ORAL at 20:48

## 2019-06-24 RX ADMIN — SERTRALINE HYDROCHLORIDE 25 MG: 50 TABLET ORAL at 18:23

## 2019-06-24 RX ADMIN — LEVOTHYROXINE SODIUM 125 MCG: 125 TABLET ORAL at 06:21

## 2019-06-24 RX ADMIN — POTASSIUM CHLORIDE 40 MEQ: 1500 TABLET, EXTENDED RELEASE ORAL at 06:21

## 2019-06-24 ASSESSMENT — ENCOUNTER SYMPTOMS
CONSTITUTIONAL NEGATIVE: 1
NERVOUS/ANXIOUS: 1
EYES NEGATIVE: 1
HALLUCINATIONS: 1
MUSCULOSKELETAL NEGATIVE: 1
RESPIRATORY NEGATIVE: 1
DEPRESSION: 1
CARDIOVASCULAR NEGATIVE: 1
INSOMNIA: 1
NEUROLOGICAL NEGATIVE: 1
GASTROINTESTINAL NEGATIVE: 1

## 2019-06-24 NOTE — PSYCHIATRY
PSYCHIATRY NOTE: Consult received and assigned to Dr. Holland. Patient will be seen within the next 24 hours. Thank you.

## 2019-06-24 NOTE — ASSESSMENT & PLAN NOTE
-Markedly elevated TSH, due to noncompliance with Synthroid.  She is not clinically in myxedema.  -Continue Synthroid 100 mcg daily, which should give approximately 1.6 mcg/kg/day.  Needs repeat TSH in 4 to 6 weeks.

## 2019-06-24 NOTE — CARE PLAN
Problem: Psychosocial needs  Goal: Anxiety reduction    Intervention: Stimuli reduction, calming techniques  Lights turned low, noise minimized to relieve pt anxiety and promote rest. Pt calm, cooperative and able to rest comfortably.      Problem: Elimination  Goal: Regular urinary elimination  Outcome: MET Date Met: 06/24/19  Pt able to void independently with no assistance.

## 2019-06-24 NOTE — PSYCHIATRY
"PSYCHIATRIC INTAKE EVALUATION    *Reason for admission: pt was admitted at [facility] and transferred to [facility] for \"medical clearance\"                   *Reason for consult: hallucinations       *Requesting Physician/APN: LUCIA Payne MD     Legal Hold on admission:   NA         *Chief Complaint: \"consfused\"     *HPI (includes Psychiatric ROS): 63 yo female interviewed by self and then discussed with  with her permission,in front of her. The following is a summary between both. Pt has been under a lot of stress from medical and financial issues. She is an  and was working part time last year and earnings decreased. In addition, in 9/2018 she went to [place] where \"spots\" were found (though to be benign on MRI but are to be monitored q 3 months. There are also issues with dtr.  feels that she began getting depressed around the beginning of this year and then a few months ago, began hallucinating and getting delusional with thoughts that the is in trouble with the court for not tending to things ( says she is simply catching on on some cases and the court asked for an update), feeling like people are following her, the food \"might have something in it\", seeing animals and birds in the backyard (this is what prompted going to [facility]) which agitated her because there were \"so many\".    Pt says her mood, which she \"thinks\" is depression \"is closer to 10 (bad) than 1\". Her anxiety is the same. She hasn't slept in 2 months, doesn't know about her appetite but  says it is down. Decreased energy ( might be from stopping synthroid), ruminates about stressors, hopeless, and anhedonic. However denies SI or wishes to be dead. She says she has felt very confused.     Anxiety: as noted. This past weekend was \"one long panic attack\"  Anitra: denies  Psychosis: as noted.  Cognitive:  says he has not noticed any issues with memory. Pt says sometimes she has gotten lost but is not impaired when pushed.  She " "is sluggish in her movements and thinking.      *Medical Review Of Symptoms (not dx conditions): note:on ROS she denied everything but her  is the one who brought up the liver concerns which she agreed with. She is also Hep C + treated with harvoni prior to 9/2018. She was dx with pulmonary HTN 20 years ago as well. Pt acknowledged this to be true but had not brought it up.  Review of Systems   Constitutional: Negative.    HENT: Positive for tinnitus (B, x a few months during the day and when trying to sleep.).    Eyes: Negative.    Respiratory: Negative.    Cardiovascular: Negative.    Gastrointestinal: Negative.    Genitourinary: Negative.    Musculoskeletal: Negative.    Skin: Negative.    Neurological: Negative.    Endo/Heme/Allergies: Negative.    Psychiatric/Behavioral: Positive for depression and hallucinations. The patient is nervous/anxious and has insomnia.      *Psychiatric Examination:   Vitals:Blood pressure (!) 95/55, pulse 69, temperature 36.3 °C (97.3 °F), temperature source Temporal, resp. rate 16, height 1.626 m (5' 4\"), weight 66.5 kg (146 lb 9.7 oz), SpO2 91 %, not currently breastfeeding.  General Appearance: good eye contact, normal habitus? (covered up with blankets), cooperative to the degree able  Abnormal Movements:slowed  Gait and Posture:lyin in bed  Speech:slowed, soft  Thought Process: slowed  Associations:linear  Abnormal or Psychotic Thoughts: as noted above  Judgement and Insight: impaired.   Orientation:x 4 when pressed.  Recent and Remote Memory:grossly intact when pushed  Attention Span and Concentration: fair  Language:fluid  Fund of Knowledge: not tested   Mood and Affect: as noted above/blunted  SI/HI: denies     *PAST MEDICAL/PSYCH/FAMILY/SOCIAL(as reported by patient):       *medical hx:        TBI:denies  SZ:denies  Stroke: denies  Past Medical History:   Diagnosis Date   • Anemia    • Cirrhosis of liver with ascites (HCC)    • Crohn disease (HCC)    • Esophageal " varices (HCC)    • Hepatitis C 1980    treated sucessfully   • Hypothyroid    • Pulmonary hypertension (HCC)      Past Surgical History:   Procedure Laterality Date   • APPENDECTOMY  1970   • BOWEL RESECTION  1970    after appendectomy r/t ruptured appendix   • CHOLECYSTECTOMY     • HYSTERECTOMY RADICAL     • THYROIDECTOMY      by ablation   • ZZZ CARDIAC CATH       *psychiatric hx:   SAs:denies  Hospitalizations:denies: was just at [facility] and transferred.  Med Hx:none  Dx Hx:none    *family Psych hx:  Not assessed     *social hx: as noted.   Alcohol: denies  Drugs:denies     *MEDICAL HX: labs, MARS, medications, etc were reviewed. Only those findings of potential interest to psychiatry are noted below:    *Current Medical issues:   See under medical below     *Allergies:  No Known Allergies  *Current Medications:    Current Facility-Administered Medications:   •  sertraline (ZOLOFT) tablet 25 mg, 25 mg, Oral, DAILY, Charlotte Antonio M.D.  •  QUEtiapine (SEROQUEL) tablet 25 mg, 25 mg, Oral, BID, Charlotte Antonio M.D.  •  QUEtiapine (SEROQUEL) tablet 100 mg, 100 mg, Oral, Q EVENING, Charlotte Antonio M.D.  •  levothyroxine (SYNTHROID) tablet 125 mcg, 125 mcg, Oral, AM ES, ERMIAS Meyer.O., 125 mcg at 06/24/19 0621  •  QUEtiapine (SEROQUEL) tablet 25 mg, 25 mg, Oral, QHS, ERMIAS Meyer.O., 25 mg at 06/23/19 2035  •  traZODone (DESYREL) tablet 50 mg, 50 mg, Oral, QHS, ERMIAS Meyer.O.  •  senna-docusate (PERICOLACE or SENOKOT S) 8.6-50 MG per tablet 2 Tab, 2 Tab, Oral, BID, 2 Tab at 06/24/19 0621 **AND** polyethylene glycol/lytes (MIRALAX) PACKET 1 Packet, 1 Packet, Oral, QDAY PRN **AND** magnesium hydroxide (MILK OF MAGNESIA) suspension 30 mL, 30 mL, Oral, QDAY PRN **AND** bisacodyl (DULCOLAX) suppository 10 mg, 10 mg, Rectal, QDAY PRN, Lucian Rob D.O.  •  enoxaparin (LOVENOX) inj 40 mg, 40 mg, Subcutaneous, DAILY, Lucian Rob D.O., 40 mg at 06/24/19 0621  •  acetaminophen  (TYLENOL) tablet 650 mg, 650 mg, Oral, Q6HRS PRN, Lucian Rob D.O.  •  enalaprilat (VASOTEC) injection 1.25 mg, 1.25 mg, Intravenous, Q6HRS PRN, Lucian Rob D.O.  •  ondansetron (ZOFRAN) syringe/vial injection 4 mg, 4 mg, Intravenous, Q4HRS PRN, Lucian Rob D.O.  •  ondansetron (ZOFRAN ODT) dispertab 4 mg, 4 mg, Oral, Q4HRS PRN, Lucian Rob D.O.  •  potassium chloride SA (Kdur) tablet 40 mEq, 40 mEq, Oral, BID, Lucian Rob D.O., 40 mEq at 06/24/19 0621  *EKG: personally reviewed QTc: 469  *Imaging: personally reviewed CT: no significant findings.   EEG:  none     *Labs:  Recent Labs      06/23/19   1626  06/24/19   0426   WBC  3.7*  1.9*   RBC  4.67  4.21   HEMOGLOBIN  12.3  11.2*   HEMATOCRIT  39.5  34.6*   MCV  84.6  82.2   MCH  26.3*  26.6*   RDW  52.9*  50.6*   PLATELETCT  57*  42*   MPV  10.8   --    NEUTSPOLYS  72.10*   --    LYMPHOCYTES  16.00*   --    MONOCYTES  9.20   --    EOSINOPHILS  1.60   --    BASOPHILS  0.80   --    Results for MAXIMINO TOPETE (MRN [id number]) as of 6/24/2019 11:06: marginalization?   Ref. Range 6/24/2019 04:26   WBC Latest Ref Range: 4.8 - 10.8 K/uL 1.9 (LL)   Results for MAXIMINO TOPETE (MRN [id number]) as of 6/24/2019 11:06   Ref. Range 6/24/2019 04:26   Platelet Count Latest Ref Range: 164 - 446 K/uL 42 (LL)   Results for MAXIMINO TOPETE (MRN [id number]) as of 6/24/2019 11:06   Ref. Range 6/23/2019 16:26   Ammonia Latest Ref Range: 11 - 45 umol/L 60 (H)   Results for MAXIMINO TOPETE (MRN [id number]) as of 6/24/2019 11:06   Ref. Range 6/23/2019 16:26   A-G Ratio Latest Units: g/dL 1.9   Results for MAXIMINO TOPETE (MRN [id number]) as of 6/24/2019 11:06   Ref. Range 6/23/2019 16:26   TSH Latest Ref Range: 0.380 - 5.330 uIU/mL 20.950 (H)     Lab Results   Component Value Date/Time    SODIUM 144 06/24/2019 04:26 AM    POTASSIUM 3.5 (L) 06/24/2019 04:26 AM    CHLORIDE 115 (H) 06/24/2019 04:26 AM    CO2 23 06/24/2019 04:26 AM    GLUCOSE 78 06/24/2019 04:26 AM    BUN  9 06/24/2019 04:26 AM    CREATININE 0.66 06/24/2019 04:26 AM               Lab Results  Component Value Date/Time   AMPHUR Negative 06/23/2019 1945   BARBSURINE Negative 06/23/2019 1945   BENZODIAZU Negative 06/23/2019 1945   COCAINEMET Negative 06/23/2019 1945   METHADONE Negative 06/23/2019 1945   OPIATES Negative 06/23/2019 1945   OXYCODN Negative 06/23/2019 1945   PCPURINE Negative 06/23/2019 1945   PROPOXY Negative 06/23/2019 1945   CANNABINOID Negative 06/23/2019 1945     No results found for: TSH, FREET4      *ASSESSMENT/PLAN:  1. Major depression severe with anxiety and psychosis  - R/O adjustment disorder with anxiety, depression and psychosis  -R/O late onset schizophrenia: seems very unlikely  -R/O lewy body dementia: unlikely  -R/O secondary compromise by elevated NH3    -start zoloft for depression and anxiety, not liver cleared  -start remeron for  sleep.  -start abilify for psychosis  -prn ativan for anxiety  -dc trazodone    2.   Medical   -elevated NH3: 60  -hx of cirrhosis and ascitics  -Hep C treated with harvoni   -hypothyroidism: being replaced.     Legal hold: NA. Have requested records from [facility] as well through . Recommended food/fluid intake and SLEEP be monitored and documented in the chart.     *Will Follow  *Thank you for the consult

## 2019-06-24 NOTE — ASSESSMENT & PLAN NOTE
-Chronic, likely due to cirrhosis/portal hypertension. No sign of gross bleeding.  -Monitor closely.  Transfuse if platelet count drops below 10,000.  Monitor for bleeding.

## 2019-06-24 NOTE — PROGRESS NOTES
Received report, assumed pt care. Pt a&o x 4, VSS, Assessment completed. Resting comfortably in bed with call light, bedside table in reach. No c/o at this time. Side rails up x 2. Instructed to use call light when needing assistance, verbalized understanding. Bed alarm off, pt up self and steady,  bed in low position. Will continue to monitor.

## 2019-06-24 NOTE — DISCHARGE PLANNING
LSW contacted Medical Records at [facility] and requested records for Psychiatry.    Update: LSW received Medical Records from [facility] and placed in pt's chart.

## 2019-06-24 NOTE — ASSESSMENT & PLAN NOTE
-Chronic, no sign of infection.  Likely due to cirrhosis.  -Monitor blood counts closely.  Monitor for fevers.  Transfuse if hemoglobin drops below 7, and platelet level drops below 10,000.

## 2019-06-24 NOTE — CARE PLAN
Problem: Safety  Goal: Will remain free from falls    Intervention: Assess risk factors for falls  Fall measures in place. Call light within reach, personal belongings close-by, bed in lowest position, IV pole on same side of bathroom, upper bed rails up, hourly rounding in place. Will continue to monitor pt for safety.       Problem: Knowledge Deficit  Goal: Knowledge of disease process/condition, treatment plan, diagnostic tests, and medications will improve    Intervention: Assess knowledge level of disease process/condition, treatment plan, diagnostic tests, and medications  Discussed poc w/ pt and pt's  edu on treatment plan and discussed goals.

## 2019-06-24 NOTE — DISCHARGE PLANNING
Medical Social Work    MSW received a call from Stella with [facility] requesting an update on pt as they sent her over.  Stella was informed that pt was admitted to [facility] for observation.

## 2019-06-24 NOTE — ASSESSMENT & PLAN NOTE
-She follows up as outpatient, and was treated with Harvoni at [place].   -Ammonia level barely elevated, and maintaining mentation.  Monitor closely, repeat ammonia level if becomes confused/encephalopathic.

## 2019-06-24 NOTE — H&P
Hospital Medicine History & Physical Note    Date of Service  6/23/2019    Primary Care Physician  Gregorio Giraldo M.D.    Consultants  Psychiatry    Code Status  Full    Chief Complaint  Hallucinations    History of Presenting Illness  64 y.o. female who presented 6/23/2019 with hallucinations.  Patient initially went to [facility], they felt it was new onset psychosis, transferred here for clearance.  Patient upon arrival was noted to have a markedly elevated TSH.  Patient states she stopped taking her Synthroid about a month ago, she misplaced her prescription.  She did not attempt to get a new prescription but recently found her Synthroid and started taking it approximately 2 days ago.  She does complain of being cold all the time but that is her only complaint as far as hypothyroid goes.  Patient began having hallucinations approximately a month ago.  She sees a forest as well as strange creatures inside of it.  She is aware that it is not there but this continues to happen.  I did discuss the case including labs and imaging with the ER physician.    Review of Systems  Review of Systems   Constitutional: Positive for malaise/fatigue. Negative for chills and fever.        Cold    HENT: Negative for congestion.    Respiratory: Negative for cough, sputum production, shortness of breath and stridor.    Cardiovascular: Negative for chest pain, palpitations and leg swelling.   Gastrointestinal: Negative for abdominal pain, constipation, diarrhea, nausea and vomiting.   Genitourinary: Negative for dysuria and urgency.   Musculoskeletal: Negative for falls and myalgias.   Neurological: Negative for dizziness, tingling, loss of consciousness, weakness and headaches.   Psychiatric/Behavioral: Negative for depression and suicidal ideas.   All other systems reviewed and are negative.      Past Medical History   has a past medical history of Anemia; Cirrhosis of liver with ascites (HCC); Crohn disease  (HCC); Esophageal varices (HCC); Hepatitis C (1980); Hypothyroid; and Pulmonary hypertension (HCC).    Surgical History   has a past surgical history that includes zzz cardiac cath; appendectomy (1970); cholecystectomy; hysterectomy radical; thyroidectomy; and bowel resection (1970).     Family History  family history includes Diabetes in her mother; Heart Failure in her father; Leukemia in her mother.     Social History   reports that she has never smoked. She has never used smokeless tobacco. She reports that she does not drink alcohol or use drugs.    Allergies  No Known Allergies    Medications  Prior to Admission Medications   Prescriptions Last Dose Informant Patient Reported? Taking?   QUEtiapine (SEROQUEL) 25 MG Tab unknown at Unknown time  Yes Yes   Sig: Take 25 mg by mouth every bedtime.   bisacodyl (DULCOLAX) 10 MG Suppos unknwon at Unknown time  Yes Yes   Sig: Insert 10 mg in rectum 1 time daily as needed.   cloNIDine (CATAPRES) 0.1 MG Tab unknown at Unknown time  Yes Yes   Sig: Take 0.1 mg by mouth every 6 hours as needed (for bp).   hydrOXYzine pamoate (VISTARIL) 50 MG Cap unknown at Unknown time  Yes Yes   Sig: Take 50 mg by mouth every four hours as needed for Anxiety.   ibuprofen (MOTRIN) 400 MG Tab unknown at Unknown time  Yes Yes   Sig: Take 400 mg by mouth every 6 hours as needed (for pain).   levothyroxine (SYNTHROID) 100 MCG Tab 6/23/2019 at 0613  Yes Yes   Sig: Take 100 mcg by mouth Every morning on an empty stomach. = 125 mcg once daily   levothyroxine (SYNTHROID) 25 MCG Tab 6/23/2019 at 0613  Yes Yes   Sig: Take 25 mcg by mouth Every morning on an empty stomach. = 125 mcg once daily   magnesium hydroxide (MILK OF MAGNESIA) 400 MG/5ML Suspension unknown at Unknown time  Yes Yes   Sig: Take 30 mL by mouth every four hours as needed (for heartburn or constipation).   traZODone (DESYREL) 50 MG Tab unknown at Unknown time  Yes Yes   Sig: Take 50 mg by mouth every bedtime.       Facility-Administered Medications: None       Physical Exam  Temp:  [37 °C (98.6 °F)] 37 °C (98.6 °F)  Pulse:  [64] 64  Resp:  [14] 14  BP: (126)/(66) 126/66  SpO2:  [97 %] 97 %    Physical Exam   Constitutional: She is oriented to person, place, and time. She appears well-developed. She is cooperative. No distress.   HENT:   Head: Normocephalic and atraumatic. Not macrocephalic and not microcephalic. Head is without raccoon's eyes and without Garcia's sign.   Right Ear: External ear normal.   Left Ear: External ear normal.   Mouth/Throat: Oropharynx is clear and moist. No oropharyngeal exudate.   Eyes: Conjunctivae are normal. Right eye exhibits no discharge. Left eye exhibits no discharge. No scleral icterus.   Neck: Neck supple. No tracheal deviation present.   Cardiovascular: Normal rate, regular rhythm and intact distal pulses.  Exam reveals no gallop, no distant heart sounds and no friction rub.    No murmur heard.  Pulmonary/Chest: Effort normal. No accessory muscle usage or stridor. No tachypnea and no bradypnea. No respiratory distress. She has no decreased breath sounds. She has no wheezes. She has no rhonchi. She has no rales. She exhibits no tenderness.   Abdominal: Soft. Bowel sounds are normal. She exhibits no distension. There is no hepatosplenomegaly, splenomegaly or hepatomegaly. There is no tenderness. There is no rebound and no guarding.   Musculoskeletal: Normal range of motion. She exhibits no edema or tenderness.   Lymphadenopathy:     She has no cervical adenopathy.   Neurological: She is alert and oriented to person, place, and time. No cranial nerve deficit. She displays no seizure activity.   Skin: Skin is warm, dry and intact. No rash noted. She is not diaphoretic. No erythema. No pallor.   Psychiatric: Her speech is normal and behavior is normal. Judgment and thought content normal. Cognition and memory are normal. She exhibits a depressed mood.   Nursing note and vitals  reviewed.      Laboratory:  Recent Labs      06/23/19   1626   WBC  3.7*   RBC  4.67   HEMOGLOBIN  12.3   HEMATOCRIT  39.5   MCV  84.6   MCH  26.3*   MCHC  31.1*   RDW  52.9*   PLATELETCT  57*   MPV  10.8     Recent Labs      06/23/19   1626   SODIUM  144   POTASSIUM  3.4*   CHLORIDE  112   CO2  23   GLUCOSE  94   BUN  10   CREATININE  0.71   CALCIUM  9.5     Recent Labs      06/23/19   1626   ALTSGPT  22   ASTSGOT  31   ALKPHOSPHAT  99   TBILIRUBIN  1.9*   GLUCOSE  94                 No results for input(s): TROPONINI in the last 72 hours.    Urinalysis:    No results found     Imaging:  CT-HEAD W/O   Final Result         1. No acute intracranial abnormality. No evidence of acute intracranial hemorrhage or mass lesion.               DX-CHEST-PORTABLE (1 VIEW)   Final Result         1. No acute cardiopulmonary abnormalities are identified.            Assessment/Plan:  I anticipate this patient is appropriate for observation status at this time.    * Hallucinations   Assessment & Plan    -Possibly due to acute psychosis  -Patient was going to go to [facility], sent here from clearance, noted markedly elevated TSH  -Obtain psychiatric consult during admission  -This is been ongoing for approximately 1 month  -CT head was negative for acute change     Hypothyroidism   Assessment & Plan    -Markedly elevated TSH  -She has not been on her Synthroid  -Resume home dose of Synthroid at 100 mcg daily     Serum total bilirubin elevated   Assessment & Plan    -Asymptomatic and chronic  -Improved from January     Hypokalemia   Assessment & Plan    -Start oral potassium  -Repeat BMP in the morning     Thrombocytopenia (HCC)   Assessment & Plan    -Chronic, no sign of gross bleeding     Leukopenia   Assessment & Plan    -Chronic, no sign of infection  -I personally reviewed the chest x-ray, noted no acute cardiopulmonary process  -Noted no urinary tract infection  -Repeat CBC in the morning         VTE prophylaxis: Lovenox

## 2019-06-24 NOTE — DISCHARGE PLANNING
Anticipated Discharge Disposition: To be determined pending IDT recommendation. Potentially return to [facility] or home with outpatient services.     Action: LSW provided pt with [facility] Community Resource Guide book outlining community resources available to potentially help with financial burdens. Information included programs through [employer]: SNAP benefits, Medicare Savings Programs and Energy Assistance program. Information on utility bill rebates and reduced cost phone line. Also reviewed/highlighted information on [facility] [facility] and local food [place].     Barriers to Discharge: establishing with additional behavioral health supports.     Plan: LSW to continue to follow and assist with discharge planning as needed.

## 2019-06-24 NOTE — ED PROVIDER NOTES
ED Provider Note    CHIEF COMPLAINT  Chief Complaint   Patient presents with   • Medical Clearance     from [facility]   • Unable to Sleep     [id number]       HPI  Effie Haley is a 64 y.o. female who presents to the emergency department sent in from [facility] due to psychosis.  Apparently this is a new onset psychosis there is a psychiatric evaluation in the written chart indicating the patient has been hallucinating.  The patient tells me she has not slept in 1 month and she has no idea why.  Further questioning reveals that she has not really been taking her medications as prescribed in particular she has not been taking her thyroid medication.  She does not wish to harm herself or others.  She denies any recent illness or trauma.    REVIEW OF SYSTEMS no fever or chills no chest pain no difficulty breathing no abdominal pain no pain or discomfort with urination no black or bloody stool or emesis.  All other systems negative    PAST MEDICAL HISTORY  Past Medical History:   Diagnosis Date   • Anemia    • Cirrhosis of liver with ascites (HCC)    • Crohn disease (HCC)    • Esophageal varices (HCC)    • Hepatitis C 1980    treated sucessfully   • Hypothyroid    • Pulmonary hypertension (HCC)        FAMILY HISTORY  Family History   Problem Relation Age of Onset   • Diabetes Mother    • Leukemia Mother    • Heart Failure Father        SOCIAL HISTORY  Social History     Social History   • Marital status:      Spouse name: N/A   • Number of children: 1   • Years of education: N/A     Occupational History   • lawer      Social History Main Topics   • Smoking status: Never Smoker   • Smokeless tobacco: Never Used   • Alcohol use No   • Drug use: No   • Sexual activity: Not on file     Other Topics Concern   • Not on file     Social History Narrative   • No narrative on file       SURGICAL HISTORY  Past Surgical History:   Procedure Laterality Date   • APPENDECTOMY  1970   • BOWEL RESECTION  1970  "   after appendectomy r/t ruptured appendix   • CHOLECYSTECTOMY     • HYSTERECTOMY RADICAL     • THYROIDECTOMY      by ablation   • ZZZ CARDIAC CATH         CURRENT MEDICATIONS  Home Medications     Reviewed by Justice Ashby (Pharmacy Tech) on 06/23/19 at 1850  Med List Status: Complete   Medication Last Dose Status   bisacodyl (DULCOLAX) 10 MG Suppos unknwon Active   cloNIDine (CATAPRES) 0.1 MG Tab unknown Active   hydrOXYzine pamoate (VISTARIL) 50 MG Cap unknown Active   ibuprofen (MOTRIN) 400 MG Tab unknown Active   levothyroxine (SYNTHROID) 100 MCG Tab 6/23/2019 Active   levothyroxine (SYNTHROID) 25 MCG Tab 6/23/2019 Active   magnesium hydroxide (MILK OF MAGNESIA) 400 MG/5ML Suspension unknown Active   QUEtiapine (SEROQUEL) 25 MG Tab unknown Active   traZODone (DESYREL) 50 MG Tab unknown Active                ALLERGIES  No Known Allergies    PHYSICAL EXAM  VITAL SIGNS: /66   Pulse 64   Temp 37 °C (98.6 °F) (Temporal)   Resp 14   Ht 1.626 m (5' 4\")   Wt 66.5 kg (146 lb 9.7 oz)   SpO2 97%   BMI 25.16 kg/m²    Oxygen saturation is interpreted as adequate  Constitutional: Awake very pleasant individual she does not appear toxic or distressed  HENT: Mucous membranes are moist  Eyes: No erythema discharge or jaundice  Neck: Trachea midline no JVD  Cardiovascular: Regular rate and rhythm  Lungs: Clear and equal bilaterally with no apparent difficulty breathing  Abdomen/Back: Soft nontender nondistended no rebound guarding or peritoneal findings  Skin: Warm and dry  Musculoskeletal: No acute bony deformity no leg edema or calf tenderness  Neurologic: Awake verbal moving all extremities without difficulty    Laboratory  CBC shows white blood cell count of 3.7 hemoglobin is adequate 12.3 basic metabolic panel is unremarkable total bilirubin is minimally elevated at 1.9 ammonia level is minimally elevated at 60 blood alcohol level is 0 TSH is very high at 20.95 indicating significant " hypothyroidism.    EKG interpretation  Twelve-lead EKG shows sinus rhythm 68 bpm there is no pathologic ST elevation NE interval is 204 ms QTc interval is 469 ms    Radiology  CT-HEAD W/O   Final Result         1. No acute intracranial abnormality. No evidence of acute intracranial hemorrhage or mass lesion.               DX-CHEST-PORTABLE (1 VIEW)   Final Result         1. No acute cardiopulmonary abnormalities are identified.        MEDICAL DECISION MAKING and DISPOSITION  In the emergency department an IV was established.  The patient was given oral Synthroid.  I reviewed the case with the hospitalist I reviewed all the findings with the patient and the patient will be admitted for further medical evaluation and treatment and she will also require psychiatric consultation in the hospital.    IMPRESSION  1.  Hypothyroidism  2.  Insomnia  3.  New onset psychosis         Electronically signed by: Kush Givens, 6/23/2019 6:50 PM

## 2019-06-24 NOTE — DISCHARGE PLANNING
Care Transition Team Assessment    LSW met with pt at bedside to complete assessment and discuss potential discharge plans and barriers. Pt was previously at [facility] and transferred to [facility] for medical clearance. Per chart review it was noted pt has been having hallucinations and delusions. Pt denies any past hx of behavioral or mental health dxs. Pt reported she does have a healthcare POA and her  should be bring in the forms to be scanned into her chart.     She reported concerns related to financial stress explaining she has cut her work hours down and her 's income is SSA; thus, making the monthly bills more difficult. She does report a new medication she was prescribed by her cardiologist which she has not picked up due to being cost prohibited, even with her Medicare part D coverage. Discuss providing pt with financial/community resources to review, pt agreeable.   Pt denied any concerns with her ADL or iADL needs. She reported her social support has declined in the last year but does report good support from her  and daughter who lives in [place].     At this time discharge pending medical team recommendations. LSW will provide pt with community resources.     Information Source  Orientation : Oriented x 4  Information Given By: Patient  Informant's Name: Effie  Who is responsible for making decisions for patient? : Patient    Readmission Evaluation  Is this a readmission?: No    Elopement Risk  Legal Hold: No  Ambulatory or Self Mobile in Wheelchair: Yes  Disoriented: No  Psychiatric Symptoms: None  History of Wandering: No  Elopement this Admit: No  Vocalizing Wanting to Leave: No  Displays Behaviors, Body Language Wanting to Leave: No-Not at Risk for Elopement  Elopement Risk: Not at Risk for Elopement    Interdisciplinary Discharge Planning  Primary Care Physician: Dr. Gregorio Giraldo  Lives with - Patient's Self Care Capacity: Spouse  Patient or legal guardian  wants to designate a caregiver (see row info): No  Support Systems: Children, Spouse / Significant Other  Housing / Facility: 1 Story House    Discharge Preparedness  What is your plan after discharge?: Uncertain - pending medical team collaboration  What are your discharge supports?: Spouse  Prior Functional Level: Ambulatory, Drives Self, Independent with Activities of Daily Living, Independent with Medication Management  Difficulity with ADLs: None  Difficulity with IADLs: None    Functional Assesment   Prior Functional Level: Ambulatory, Drives Self, Independent with Activities of Daily Living, Independent with Medication Management    Finances  Financial Barriers to Discharge: No. Pt did not disclose income. Although there are no financial barriers to pt's discharge pt does express stress related to her finances. Resources for community services to be provided.   Prescription Coverage: Yes    Vision / Hearing Impairment  Vision Impairment : Yes  Right Eye Vision:  (cataract)  Left Eye Vision:  (cataract)  Hearing Impairment : No     Advance Directive  Advance Directive?: DPOA for Health Care (Currently not on file. Pt reported spouse to bring in copy)    Domestic Abuse  Have you ever been the victim of abuse or violence?: No  Physical Abuse or Sexual Abuse: No  Verbal Abuse or Emotional Abuse: No  Possible Abuse Reported to:: Not Applicable    Psychological Assessment  History of Substance Abuse: None  History of Psychiatric Problems: No (Pt denies hx of mental health. Previously at [facility])    Discharge Risks or Barriers  Discharge risks or barriers?: Mental health  Patient risk factors: Mental health, pt may benefit from additional behavioral health resources.     Anticipated Discharge Information  Anticipated discharge disposition: Discharge needs currently unknown

## 2019-06-24 NOTE — ED NOTES
Patient resting in bed, awake alert and oriented x 4, Sandra 15, interactions noted as appropriate, bed in low position, sitter in hallway performing direct observation, no cough noted unlabored breathing noted, frequent rounding performed, will continue to assess patient.

## 2019-06-24 NOTE — PROGRESS NOTES
Hospital Medicine Daily Progress Note    Date of Service  6/24/2019    Chief Complaint  Hallucinations    Hospital Course    64 y.o. female with liver cirrhosis, Crohn's disease, hepatitis C, hypothyroidism, admitted 6/23/2019 with hallucination which started about a month ago.  She went to [facility], where she was found to have significantly elevated TSH of 20.95.  Patient admitted to not taking her Synthroid for a month now, and just restarted 2 days ago.  Initial blood work-up showed potassium 3.4, with normal sodium, no leukocytosis, normal creatinine.  Head CT showed nothing acute.  Chest x-ray showed no infiltrates or consolidation.  Psychiatry was consulted.  Synthroid dose was resumed.  Potassium was replaced.        Interval Problem Update  6/24/2019 - I reviewed the patient's chart. There were no significant overnight events. Remains hemodynamically stable and afebrile. Stable on RA. WBC count 1900, platelet count of 42,000, and hemoglobin at 11.2.  Potassium 3.5.  Creatinine and sodium are normal.  Pending psychiatry evaluation.    > I have personally seen and examined the patient today.  She is comfortable.  She states she has not had any hallucinations in the last 24 hours.  She does not have any complaints.  No nausea or vomiting.  No chest pain or shortness of breath.  No abdominal pain or diarrhea.      Consultants/Specialty  Psychiatry    Code Status  Full    Disposition  Monitor on the floors.    Review of Systems  ROS     Pertinent positives/negatives as mentioned above.     A complete review of systems was personally done by me. All other systems were negative.       Physical Exam  Temp:  [36.1 °C (97 °F)-37 °C (98.6 °F)] 36.3 °C (97.3 °F)  Pulse:  [63-72] 69  Resp:  [14-17] 16  BP: ()/(46-66) 95/55  SpO2:  [91 %-97 %] 91 %    Physical Exam   Constitutional: She is oriented to person, place, and time. She appears well-developed and well-nourished. No distress.   HENT:    Head: Normocephalic and atraumatic.   Mouth/Throat: No oropharyngeal exudate.   Eyes: Pupils are equal, round, and reactive to light. Conjunctivae are normal. No scleral icterus.   Neck: Normal range of motion. Neck supple.   Cardiovascular: Normal rate and regular rhythm.  Exam reveals no gallop and no friction rub.    No murmur heard.  Pulmonary/Chest: Effort normal and breath sounds normal. No respiratory distress. She has no wheezes. She has no rales. She exhibits no tenderness.   Abdominal: Soft. Bowel sounds are normal. She exhibits no distension. There is no tenderness. There is no rebound and no guarding.   Musculoskeletal: Normal range of motion. She exhibits no edema or tenderness.   Lymphadenopathy:     She has no cervical adenopathy.   Neurological: She is alert and oriented to person, place, and time. No cranial nerve deficit.   Skin: Skin is warm and dry. No rash noted. She is not diaphoretic. No erythema. No pallor.   Psychiatric:   No further hallucinations, delusions.  Flat affect. Depressed mood.  Intact judgment, normal thoughts.   Nursing note and vitals reviewed.           Fluids    Intake/Output Summary (Last 24 hours) at 06/24/19 1055  Last data filed at 06/24/19 0951   Gross per 24 hour   Intake              240 ml   Output                0 ml   Net              240 ml       Laboratory  Recent Labs      06/23/19   1626  06/24/19   0426   WBC  3.7*  1.9*   RBC  4.67  4.21   HEMOGLOBIN  12.3  11.2*   HEMATOCRIT  39.5  34.6*   MCV  84.6  82.2   MCH  26.3*  26.6*   MCHC  31.1*  32.4*   RDW  52.9*  50.6*   PLATELETCT  57*  42*   MPV  10.8   --      Recent Labs      06/23/19   1626  06/24/19   0426   SODIUM  144  144   POTASSIUM  3.4*  3.5*   CHLORIDE  112  115*   CO2  23  23   GLUCOSE  94  78   BUN  10  9   CREATININE  0.71  0.66   CALCIUM  9.5  8.5                   Imaging  CT-HEAD W/O   Final Result         1. No acute intracranial abnormality. No evidence of acute intracranial hemorrhage or  mass lesion.               DX-CHEST-PORTABLE (1 VIEW)   Final Result         1. No acute cardiopulmonary abnormalities are identified.           Assessment/Plan  * Hallucinations- (present on admission)   Assessment & Plan    -Likely due to major depression with psychotic features.  Doubt this is related to her hypothyroidism, she does not appear to be in myxedema.  -Management as above.     Severe major depression with psychotic features (HCC)- (present on admission)   Assessment & Plan    - discussed with psychiatry. Will start on seroquel and zoloft.      Hypothyroidism- (present on admission)   Assessment & Plan    -Markedly elevated TSH, due to noncompliance with Synthroid.  She is not clinically in myxedema.  -Continue Synthroid 100 mcg daily, which should give approximately 1.6 mcg/kg/day.  Needs repeat TSH in 4 to 6 weeks.     Hypokalemia- (present on admission)   Assessment & Plan    -Continue replacement with oral K-Dur 40 mg twice daily.  Check magnesium level and replace if low.  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   -Ammonia level barely elevated, and maintaining mentation.  Monitor closely, repeat ammonia level if becomes confused/encephalopathic.     Serum total bilirubin elevated- (present on admission)   Assessment & Plan    -Asymptomatic and chronic. Due to cirrhosis.     Thrombocytopenia (HCC)- (present on admission)   Assessment & Plan    -Chronic, likely due to cirrhosis/portal hypertension. No sign of gross bleeding.  -Monitor closely.  Transfuse if platelet count drops below 10,000.  Monitor for bleeding.     Pancytopenia (HCC)- (present on admission)   Assessment & Plan    -Chronic, no sign of infection.  Likely due to cirrhosis.  -Monitor blood counts closely.  Monitor for fevers.  Transfuse if hemoglobin drops below 7, and platelet level  drops below 10,000.          VTE prophylaxis: SCD

## 2019-06-24 NOTE — ASSESSMENT & PLAN NOTE
-Likely due to major depression with psychotic features.  Doubt this is related to her hypothyroidism, she does not appear to be in myxedema.  -Management as above.

## 2019-06-24 NOTE — ED NOTES
Patient resting in bed, awake alert and oriented x 4, Sandra 15, bed in low position, call light within reach, on room air, unlabored breathing noted, denies pain, sitter in hallway performing direct observation, frequent rounding performed.

## 2019-06-24 NOTE — PROGRESS NOTES
Pt up to floor, ambulated to bed independently w/ steady gait, pt is AAOX4, VSS, pt given snacks per request, admit profile completed, pt's  [place] @ bedside to visit. No needs at the moment. Fall measures in place. Call light within reach, personal belongings close-by, bed in lowest position, IV pole on same side of bathroom, upper bed rails up, hourly rounding in place. Will continue to monitor pt for safety.

## 2019-06-25 LAB
ANION GAP SERPL CALC-SCNC: 8 MMOL/L (ref 0–11.9)
BASOPHILS # BLD AUTO: 0.8 % (ref 0–1.8)
BASOPHILS # BLD: 0.02 K/UL (ref 0–0.12)
BUN SERPL-MCNC: 12 MG/DL (ref 8–22)
CALCIUM SERPL-MCNC: 8.9 MG/DL (ref 8.5–10.5)
CHLORIDE SERPL-SCNC: 115 MMOL/L (ref 96–112)
CO2 SERPL-SCNC: 20 MMOL/L (ref 20–33)
CREAT SERPL-MCNC: 0.64 MG/DL (ref 0.5–1.4)
EOSINOPHIL # BLD AUTO: 0.06 K/UL (ref 0–0.51)
EOSINOPHIL NFR BLD: 2.5 % (ref 0–6.9)
ERYTHROCYTE [DISTWIDTH] IN BLOOD BY AUTOMATED COUNT: 51.3 FL (ref 35.9–50)
GLUCOSE SERPL-MCNC: 76 MG/DL (ref 65–99)
HCT VFR BLD AUTO: 35.8 % (ref 37–47)
HGB BLD-MCNC: 11.6 G/DL (ref 12–16)
IMM GRANULOCYTES # BLD AUTO: 0.01 K/UL (ref 0–0.11)
IMM GRANULOCYTES NFR BLD AUTO: 0.4 % (ref 0–0.9)
LYMPHOCYTES # BLD AUTO: 0.47 K/UL (ref 1–4.8)
LYMPHOCYTES NFR BLD: 19.7 % (ref 22–41)
MCH RBC QN AUTO: 26.5 PG (ref 27–33)
MCHC RBC AUTO-ENTMCNC: 32.4 G/DL (ref 33.6–35)
MCV RBC AUTO: 81.9 FL (ref 81.4–97.8)
MONOCYTES # BLD AUTO: 0.26 K/UL (ref 0–0.85)
MONOCYTES NFR BLD AUTO: 10.9 % (ref 0–13.4)
NEUTROPHILS # BLD AUTO: 1.57 K/UL (ref 2–7.15)
NEUTROPHILS NFR BLD: 65.7 % (ref 44–72)
NRBC # BLD AUTO: 0 K/UL
NRBC BLD-RTO: 0 /100 WBC
PLATELET # BLD AUTO: 56 K/UL (ref 164–446)
PMV BLD AUTO: 13.3 FL (ref 9–12.9)
POTASSIUM SERPL-SCNC: 4.3 MMOL/L (ref 3.6–5.5)
RBC # BLD AUTO: 4.37 M/UL (ref 4.2–5.4)
SODIUM SERPL-SCNC: 143 MMOL/L (ref 135–145)
T4 FREE SERPL-MCNC: 1.15 NG/DL (ref 0.53–1.43)
WBC # BLD AUTO: 2.5 K/UL (ref 4.8–10.8)

## 2019-06-25 PROCEDURE — A9270 NON-COVERED ITEM OR SERVICE: HCPCS | Performed by: PSYCHIATRY & NEUROLOGY

## 2019-06-25 PROCEDURE — 36415 COLL VENOUS BLD VENIPUNCTURE: CPT

## 2019-06-25 PROCEDURE — 99224 PR SUBSEQUENT OBSERVATION CARE,LEVEL I: CPT | Performed by: HOSPITALIST

## 2019-06-25 PROCEDURE — 84439 ASSAY OF FREE THYROXINE: CPT

## 2019-06-25 PROCEDURE — 700102 HCHG RX REV CODE 250 W/ 637 OVERRIDE(OP): Performed by: INTERNAL MEDICINE

## 2019-06-25 PROCEDURE — 99214 OFFICE O/P EST MOD 30 MIN: CPT | Performed by: PSYCHIATRY & NEUROLOGY

## 2019-06-25 PROCEDURE — 700102 HCHG RX REV CODE 250 W/ 637 OVERRIDE(OP): Performed by: PSYCHIATRY & NEUROLOGY

## 2019-06-25 PROCEDURE — A9270 NON-COVERED ITEM OR SERVICE: HCPCS | Performed by: INTERNAL MEDICINE

## 2019-06-25 PROCEDURE — 85025 COMPLETE CBC W/AUTO DIFF WBC: CPT

## 2019-06-25 PROCEDURE — 80048 BASIC METABOLIC PNL TOTAL CA: CPT

## 2019-06-25 PROCEDURE — G0378 HOSPITAL OBSERVATION PER HR: HCPCS

## 2019-06-25 RX ADMIN — LEVOTHYROXINE SODIUM 125 MCG: 125 TABLET ORAL at 06:01

## 2019-06-25 RX ADMIN — MIRTAZAPINE 7.5 MG: 15 TABLET, FILM COATED ORAL at 20:05

## 2019-06-25 RX ADMIN — ARIPIPRAZOLE 5 MG: 10 TABLET ORAL at 20:05

## 2019-06-25 RX ADMIN — POTASSIUM CHLORIDE 40 MEQ: 1500 TABLET, EXTENDED RELEASE ORAL at 06:01

## 2019-06-25 RX ADMIN — SERTRALINE HYDROCHLORIDE 25 MG: 50 TABLET ORAL at 06:01

## 2019-06-25 ASSESSMENT — ENCOUNTER SYMPTOMS
CHILLS: 0
FEVER: 0
PALPITATIONS: 0
VOMITING: 0
NAUSEA: 0
HALLUCINATIONS: 0

## 2019-06-25 NOTE — PROGRESS NOTES
"Received report, assumed pt care. Pt a&o x 4, VSS, Assessment completed. As per pt, \" I rested well last night.\" Resting comfortably in bed with call light, bedside table in reach. No c/o at this time. Side rails up x 2. Instructed to use call light when needing assistance,  verbalized understanding. Bed alarm off, pt up self and steady,  bed in low position. Will continue to monitor.       "

## 2019-06-25 NOTE — PROGRESS NOTES
Alee from Lab called with critical result of WBC of 2.3 at 0145. Critical lab result read back to Alee.   This critical lab result is within parameters established by [facility] policy for this patient

## 2019-06-25 NOTE — PROGRESS NOTES
Hospital Medicine Daily Progress Note    Date of Service  6/25/2019    Chief Complaint  Hallucinations    Hospital Course    64 y.o. female with liver cirrhosis, Crohn's disease, hepatitis C, hypothyroidism, admitted 6/23/2019 with hallucination which started about a month ago.  She went to [facility], where she was found to have significantly elevated TSH of 20.95.  Patient admitted to not taking her Synthroid for a month now, and just restarted 2 days ago.  Initial blood work-up showed potassium 3.4, with normal sodium, no leukocytosis, normal creatinine.  Head CT showed nothing acute.  Chest x-ray showed no infiltrates or consolidation.  Psychiatry was consulted and started her on zoloft, remeron, and abilify.  Synthroid dose was resumed.  Potassium was replaced.        Interval Problem Update  No acute events overnight  Denies any further hallucinations  She slept well tonight   She denies any SI/HI  Wanting to go home upon discharge    Consultants/Specialty  Psychiatry    Code Status  Full    Disposition  Home vs behavioural health. Awaiting psych recs.  Pt is medically cleared.     Review of Systems  Review of Systems   Constitutional: Negative for chills and fever.   Cardiovascular: Negative for chest pain and palpitations.   Gastrointestinal: Negative for nausea and vomiting.   Psychiatric/Behavioral: Negative for hallucinations and suicidal ideas.          Physical Exam  Temp:  [36.4 °C (97.5 °F)-36.7 °C (98.1 °F)] 36.4 °C (97.5 °F)  Pulse:  [63-73] 63  Resp:  [15-18] 18  BP: (121-131)/(61-69) 131/61  SpO2:  [91 %-99 %] 92 %    Physical Exam   Constitutional: She appears well-developed and well-nourished.   HENT:   Head: Normocephalic and atraumatic.   Eyes: Conjunctivae are normal. Right eye exhibits no discharge. Left eye exhibits no discharge.   Pulmonary/Chest: Effort normal. No respiratory distress.   Neurological: She is alert.   Skin: Skin is warm and dry.   Nursing note and  vitals reviewed.           Fluids    Intake/Output Summary (Last 24 hours) at 06/25/19 1528  Last data filed at 06/25/19 0845   Gross per 24 hour   Intake              360 ml   Output                0 ml   Net              360 ml       Laboratory  Recent Labs      06/23/19   1626  06/24/19   0426  06/25/19   0015   WBC  3.7*  1.9*  2.5*   RBC  4.67  4.21  4.37   HEMOGLOBIN  12.3  11.2*  11.6*   HEMATOCRIT  39.5  34.6*  35.8*   MCV  84.6  82.2  81.9   MCH  26.3*  26.6*  26.5*   MCHC  31.1*  32.4*  32.4*   RDW  52.9*  50.6*  51.3*   PLATELETCT  57*  42*  56*   MPV  10.8   --   13.3*     Recent Labs      06/23/19   1626  06/24/19   0426  06/25/19   0015   SODIUM  144  144  143   POTASSIUM  3.4*  3.5*  4.3   CHLORIDE  112  115*  115*   CO2  23  23  20   GLUCOSE  94  78  76   BUN  10  9  12   CREATININE  0.71  0.66  0.64   CALCIUM  9.5  8.5  8.9                   Imaging  CT-HEAD W/O   Final Result         1. No acute intracranial abnormality. No evidence of acute intracranial hemorrhage or mass lesion.               DX-CHEST-PORTABLE (1 VIEW)   Final Result         1. No acute cardiopulmonary abnormalities are identified.           Assessment/Plan  * Hallucinations- (present on admission)   Assessment & Plan    -Likely due to major depression with psychotic features.  Doubt this is related to her hypothyroidism, she does not appear to be in myxedema.  -Management as above.     Severe major depression with psychotic features (HCC)- (present on admission)   Assessment & Plan    - discussed with psychiatry. Will start on seroquel and zoloft.      Hypothyroidism- (present on admission)   Assessment & Plan    -Markedly elevated TSH, due to noncompliance with Synthroid.  She is not clinically in myxedema.  -Continue Synthroid 100 mcg daily, which should give approximately 1.6 mcg/kg/day.  Needs repeat TSH in 4 to 6 weeks.     Hypokalemia- (present on admission)   Assessment & Plan    resolved     Liver cirrhosis due to  hepatitis C(HCC)- (present on admission)   Assessment & Plan    -She follows up as outpatient, and was treated with Harvoni at [place].   -Ammonia level barely elevated, and maintaining mentation.  Monitor closely, repeat ammonia level if becomes confused/encephalopathic.     Serum total bilirubin elevated- (present on admission)   Assessment & Plan    -Asymptomatic and chronic. Due to cirrhosis.     Thrombocytopenia (HCC)- (present on admission)   Assessment & Plan    -Chronic, likely due to cirrhosis/portal hypertension. No sign of gross bleeding.  -Monitor closely.  Transfuse if platelet count drops below 10,000.  Monitor for bleeding.     Pancytopenia (HCC)- (present on admission)   Assessment & Plan    -Chronic, no sign of infection.  Likely due to cirrhosis.  -Monitor blood counts closely.  Monitor for fevers.  Transfuse if hemoglobin drops below 7, and platelet level drops below 10,000.          VTE prophylaxis: SCD

## 2019-06-25 NOTE — PSYCHIATRY
"PSYCHIATRIC FOLLOW-UP:(established)  *Reason for admission: pt was admitted at [facility] and transferred to [facility] for \"medical clearance\"      *Legal Hold Status on Admission: NA                 *HPI:   Sleepy, slept well last night, psychosis is \"a little better\". Not SI. Depression the same. No side effects.          *Psychiatric Examination:  Vitals: Blood pressure 123/60, pulse 73, temperature 36.9 °C (98.4 °F), temperature source Temporal, resp. rate 18, height 1.626 m (5' 4\"), weight 66.5 kg (146 lb 9.7 oz), SpO2 94 %, not currently breastfeeding.    General Appearance: looking at me through half closed lids   Abnormal Movements:slowed  Gait and Posture:curled up in bed  Speech:slowed, soft, minimal  Thought Process: slowed  Associations:linear  Abnormal or Psychotic Thoughts: as noted above  Judgement and Insight: impaired.   Orientation:grossly  Recent and Remote Memory:grossly   Attention Span and Concentration: fair  Language:fluid  Fund of Knowledge: not tested   Mood and Affect: as noted above/blunted  SI/HI: denies      *ASSESSMENT/PLAN:  1. Major depression severe with anxiety and psychosis  - R/O adjustment disorder with anxiety, depression and psychosis  -R/O late onset schizophrenia: seems very unlikely  -R/O lewy body dementia: unlikely  -R/O secondary compromise by elevated NH3     -increase zoloft on 6/26  -continue remeron   -increase abilify on 6/26  -prn ativan for anxiety      2.   Medical   -elevated NH3: 60  -hx of cirrhosis and ascitics  -Hep C treated with harvoni   -hypothyroidism: being replaced.  -pancytopenia     *Legal hold: NA.                *Will Follow      "

## 2019-06-25 NOTE — CARE PLAN
Problem: Safety  Goal: Will remain free from falls    Intervention: Assess risk factors for falls  Fall measures in place. Call light within reach, personal belongings close-by, bed in lowest position, IV pole on same side of bathroom, upper bed rails up, hourly rounding in place. Will continue to monitor pt for safety.       Problem: Knowledge Deficit  Goal: Knowledge of disease process/condition, treatment plan, diagnostic tests, and medications will improve    Intervention: Assess knowledge level of disease process/condition, treatment plan, diagnostic tests, and medications  Discussed POC w/ pt and family. All questions answered.

## 2019-06-25 NOTE — PROGRESS NOTES
· 2 RN skin check completed w/ Jenaro  · Devices in place: PIV.  · Skin assessed under devices: Intact.  · Confirmed pressure ulcers found on: n/a.  · New potential pressure ulcers noted on: n/a  · The following interventions are in place: ensuring pt remains free of moisture, encouraging pt to turn, edu pt about PU prevention.

## 2019-06-25 NOTE — CARE PLAN
Problem: Risk for Impaired Mobility  Goal: Activity Level appropriate for Discharge or Transfer    Intervention: Progressive Mobilization--ADL Flow Sheet   06/25/19 1320   OTHER   Activity Performed Sitting up in bed;Up for meal     Pt ambulated around the unit with no assistance. Pt steady.      Problem: Pain  Goal: Alleviation of Pain or a reduction in pain to the patient's comfort goal  Outcome: MET Date Met: 06/25/19  Pt remains free of pain.

## 2019-06-25 NOTE — PROGRESS NOTES
Assumed care of pt after receiving report from dayshift RN. Bedside rounding completed w/ dayshift RN. Pt resting in bed A&OX4 w/ no complaints of pain or discomfort, discussed poc w/ pt. Fall measures in place, call light within reach, personal belongings nearby, bed in lowest position, and hourly rounding in place. Will continue to monitor pt.

## 2019-06-26 PROCEDURE — A9270 NON-COVERED ITEM OR SERVICE: HCPCS | Performed by: PSYCHIATRY & NEUROLOGY

## 2019-06-26 PROCEDURE — A9270 NON-COVERED ITEM OR SERVICE: HCPCS | Performed by: INTERNAL MEDICINE

## 2019-06-26 PROCEDURE — 99214 OFFICE O/P EST MOD 30 MIN: CPT | Performed by: PSYCHIATRY & NEUROLOGY

## 2019-06-26 PROCEDURE — G0378 HOSPITAL OBSERVATION PER HR: HCPCS

## 2019-06-26 PROCEDURE — 99225 PR SUBSEQUENT OBSERVATION CARE,LEVEL II: CPT | Performed by: HOSPITALIST

## 2019-06-26 PROCEDURE — 700102 HCHG RX REV CODE 250 W/ 637 OVERRIDE(OP): Performed by: INTERNAL MEDICINE

## 2019-06-26 PROCEDURE — 700102 HCHG RX REV CODE 250 W/ 637 OVERRIDE(OP): Performed by: PSYCHIATRY & NEUROLOGY

## 2019-06-26 RX ORDER — ARIPIPRAZOLE 10 MG/1
10 TABLET ORAL
Status: DISCONTINUED | OUTPATIENT
Start: 2019-06-26 | End: 2019-06-27 | Stop reason: HOSPADM

## 2019-06-26 RX ADMIN — ARIPIPRAZOLE 10 MG: 10 TABLET ORAL at 20:24

## 2019-06-26 RX ADMIN — LEVOTHYROXINE SODIUM 125 MCG: 125 TABLET ORAL at 05:30

## 2019-06-26 RX ADMIN — MIRTAZAPINE 7.5 MG: 15 TABLET, FILM COATED ORAL at 20:24

## 2019-06-26 RX ADMIN — SERTRALINE HYDROCHLORIDE 25 MG: 50 TABLET ORAL at 05:30

## 2019-06-26 ASSESSMENT — ENCOUNTER SYMPTOMS
CHILLS: 0
VOMITING: 0
HALLUCINATIONS: 0
NAUSEA: 0
PALPITATIONS: 0
FEVER: 0

## 2019-06-26 NOTE — CARE PLAN
Problem: Safety  Goal: Will remain free from injury  Outcome: PROGRESSING AS EXPECTED  Bed on lowest position, call light within reach, patient educated about use of call light and safety precautions.     Problem: Discharge Barriers/Planning  Goal: Patient's continuum of care needs will be met  Outcome: PROGRESSING AS EXPECTED  Pt to go home with outpatient psych or to psych facility.

## 2019-06-26 NOTE — PSYCHIATRY
"PSYCHIATRIC FOLLOW-UP:(established)  *Reason for admission:  pt was admitted at [facility] and transferred to [facility] for \"medical clearance\"          *Legal Hold Status on Admission:  NA                    *HPI: sitting up in bed, managed a small smile. Today, no odd phenomenon, denies SI, mood \"a little better\", hope: \"alittle\". Slept again. No side effects.  wants specific details as to what he should notice and what should prompt him to bring her in, can she drive (not now: her energy and reaction time are low), dtr is coming and she is depressed and what to do....................... We discussed these.  No specific recommendations given because there aside from SI/HI/psychosis, there is no one clear and determiing symptom that indicates hospitalizations but if any doubt, to bring her to the ED for evaluation.          *Psychiatric Examination:  Vitals: Blood pressure 118/56, pulse (!) 59, temperature 36.2 °C (97.2 °F), temperature source Temporal, resp. rate 16, height 1.626 m (5' 4\"), weight 66.5 kg (146 lb 9.7 oz), SpO2 92 %, not currently breastfeeding.  General Appearance: looking at me directly, good eye contact.   Abnormal Movements: more spontaneous movements but still tends to stillness  Gait and Posture: sitting up in bed  Speech: minimal  Thought processes: slowed  Associations: linear  Abnormal or Psychotic Thoughts: none today  Judgement and Insight: improved  Orientation: grossly  Recent and Remote Memory: grossly  Attention Span and Concentration: intact  Language: fluid  Fund of Knowledge:not tested  Mood and Affect:depressed, anxious , a \"6\"/ a small spontaneous smile  SI/HI:denies      *ASSESSMENT/PLAN:  1. Major depression severe with anxiety and psychosis  -increase zoloft    -continue remeron   -increase abilify    -prn ativan for anxiety  -discussed med regimen, risks/benefits/expectations      2.   Medical   -elevated NH3: 60  -hx of cirrhosis and ascitics  -Hep C treated with harvoni "   -hypothyroidism: being replaced.  -pancytopenia     *Legal hold:  meds are increased. Will come by in the afternoon and if she has not had any side effects, or other concerns will dc to  rather than to inpt.     Discussed with Dr Trinidad CORONA and                 *Will Follow  *Thank you for the consult

## 2019-06-26 NOTE — PROGRESS NOTES
Hospital Medicine Daily Progress Note    Date of Service  6/26/2019    Chief Complaint  Hallucinations    Hospital Course    64 y.o. female with liver cirrhosis, Crohn's disease, hepatitis C, hypothyroidism, admitted 6/23/2019 with hallucination which started about a month ago.  She went to [facility], where she was found to have significantly elevated TSH of 20.95.  Patient admitted to not taking her Synthroid for a month now, and just restarted 2 days ago.  Initial blood work-up showed potassium 3.4, with normal sodium, no leukocytosis, normal creatinine.  Head CT showed nothing acute.  Chest x-ray showed no infiltrates or consolidation.  Psychiatry was consulted and started her on zoloft, remeron, and abilify.  Synthroid dose was resumed.  Potassium was replaced.        Interval Problem Update  No acute events overnight  I discussed case with Dr. Holland. She recommends observing patient in hospital for one more day with psych meds prior to dc home  Pt's  very anxious about patient discharge    Consultants/Specialty  Psychiatry    Code Status  Full    Disposition  Home vs behavioural health. Awaiting psych recs.  Pt is medically cleared.     Review of Systems  Review of Systems   Constitutional: Negative for chills and fever.   Cardiovascular: Negative for chest pain and palpitations.   Gastrointestinal: Negative for nausea and vomiting.   Psychiatric/Behavioral: Negative for hallucinations and suicidal ideas.          Physical Exam  Temp:  [36.2 °C (97.2 °F)-37.1 °C (98.8 °F)] 36.2 °C (97.2 °F)  Pulse:  [59-73] 59  Resp:  [16-18] 16  BP: (118-126)/(56-61) 118/56  SpO2:  [90 %-94 %] 92 %    Physical Exam   Constitutional: She appears well-developed and well-nourished.   HENT:   Head: Normocephalic and atraumatic.   Eyes: Conjunctivae are normal. Right eye exhibits no discharge. Left eye exhibits no discharge.   Pulmonary/Chest: Effort normal. No respiratory distress.   Neurological:  She is alert.   Skin: Skin is warm and dry.   Nursing note and vitals reviewed.           Fluids    Intake/Output Summary (Last 24 hours) at 06/26/19 1056  Last data filed at 06/25/19 2005   Gross per 24 hour   Intake              100 ml   Output                0 ml   Net              100 ml       Laboratory  Recent Labs      06/23/19   1626  06/24/19   0426  06/25/19   0015   WBC  3.7*  1.9*  2.5*   RBC  4.67  4.21  4.37   HEMOGLOBIN  12.3  11.2*  11.6*   HEMATOCRIT  39.5  34.6*  35.8*   MCV  84.6  82.2  81.9   MCH  26.3*  26.6*  26.5*   MCHC  31.1*  32.4*  32.4*   RDW  52.9*  50.6*  51.3*   PLATELETCT  57*  42*  56*   MPV  10.8   --   13.3*     Recent Labs      06/23/19   1626  06/24/19   0426  06/25/19   0015   SODIUM  144  144  143   POTASSIUM  3.4*  3.5*  4.3   CHLORIDE  112  115*  115*   CO2  23  23  20   GLUCOSE  94  78  76   BUN  10  9  12   CREATININE  0.71  0.66  0.64   CALCIUM  9.5  8.5  8.9                   Imaging  CT-HEAD W/O   Final Result         1. No acute intracranial abnormality. No evidence of acute intracranial hemorrhage or mass lesion.               DX-CHEST-PORTABLE (1 VIEW)   Final Result         1. No acute cardiopulmonary abnormalities are identified.           Assessment/Plan  * Hallucinations- (present on admission)   Assessment & Plan    -Likely due to major depression with psychotic features.  Doubt this is related to her hypothyroidism, she does not appear to be in myxedema.  -Management as above.     Severe major depression with psychotic features (HCC)- (present on admission)   Assessment & Plan    - discussed with psychiatry. Will start on seroquel and zoloft.      Hypothyroidism- (present on admission)   Assessment & Plan    -Markedly elevated TSH, due to noncompliance with Synthroid.  She is not clinically in myxedema.  -Continue Synthroid 100 mcg daily, which should give approximately 1.6 mcg/kg/day.  Needs repeat TSH in 4 to 6 weeks.     Hypokalemia- (present on admission)    Assessment & Plan    resolved     Liver cirrhosis due to hepatitis C(HCC)- (present on admission)   Assessment & Plan    -She follows up as outpatient, and was treated with Harvoni at [facility].   -Ammonia level barely elevated, and maintaining mentation.  Monitor closely, repeat ammonia level if becomes confused/encephalopathic.     Serum total bilirubin elevated- (present on admission)   Assessment & Plan    -Asymptomatic and chronic. Due to cirrhosis.     Thrombocytopenia (HCC)- (present on admission)   Assessment & Plan    -Chronic, likely due to cirrhosis/portal hypertension. No sign of gross bleeding.  -Monitor closely.  Transfuse if platelet count drops below 10,000.  Monitor for bleeding.     Pancytopenia (HCC)- (present on admission)   Assessment & Plan    -Chronic, no sign of infection.  Likely due to cirrhosis.  -Monitor blood counts closely.  Monitor for fevers.  Transfuse if hemoglobin drops below 7, and platelet level drops below 10,000.          VTE prophylaxis: SCD    I have seen and examined patient on 6/26/2019. I have reviewed vitals, new labs and imaging. I have discussed POC with RN. There are no changes from (6/25/2019) except for what is mentioned above.

## 2019-06-26 NOTE — DISCHARGE PLANNING
LSW received IP Consult from BUNNY Antonio requested pt not to be transferred back to [facility], pt will be discharged home. LSW contacted [facility] and informed pt will not be returning.

## 2019-06-26 NOTE — PROGRESS NOTES
"Assumed care at 1900. Received report from RN. Patient is AOx4. Patient has been calm and cooperative. Pending dc plan to home with outpatient psych or back to psych facility. Assessment complete. Labs reviewed. Patient and RN discussed plan of care. Patient questions answered. Patient needs are met at this time. Bed in lowest and locked position. Call light is within reach. Hourly rounding in place. /61   Pulse 72 Comment: rn notified   Temp 36.9 °C (98.5 °F) (Temporal)   Resp 18   Ht 1.626 m (5' 4\")   Wt 66.5 kg (146 lb 9.7 oz)   SpO2 93%   Breastfeeding? No   BMI 25.16 kg/m²       "

## 2019-06-27 ENCOUNTER — PATIENT OUTREACH (OUTPATIENT)
Dept: HEALTH INFORMATION MANAGEMENT | Facility: OTHER | Age: 64
End: 2019-06-27

## 2019-06-27 VITALS
TEMPERATURE: 98 F | WEIGHT: 146.61 LBS | HEART RATE: 66 BPM | HEIGHT: 64 IN | RESPIRATION RATE: 17 BRPM | OXYGEN SATURATION: 96 % | DIASTOLIC BLOOD PRESSURE: 69 MMHG | SYSTOLIC BLOOD PRESSURE: 122 MMHG | BODY MASS INDEX: 25.03 KG/M2

## 2019-06-27 PROCEDURE — 90838 PSYTX W PT W E/M 60 MIN: CPT | Performed by: PSYCHIATRY & NEUROLOGY

## 2019-06-27 PROCEDURE — 700102 HCHG RX REV CODE 250 W/ 637 OVERRIDE(OP): Performed by: INTERNAL MEDICINE

## 2019-06-27 PROCEDURE — A9270 NON-COVERED ITEM OR SERVICE: HCPCS | Performed by: PSYCHIATRY & NEUROLOGY

## 2019-06-27 PROCEDURE — 99217 PR OBSERVATION CARE DISCHARGE: CPT | Performed by: HOSPITALIST

## 2019-06-27 PROCEDURE — G0378 HOSPITAL OBSERVATION PER HR: HCPCS

## 2019-06-27 PROCEDURE — 700102 HCHG RX REV CODE 250 W/ 637 OVERRIDE(OP): Performed by: PSYCHIATRY & NEUROLOGY

## 2019-06-27 PROCEDURE — 99214 OFFICE O/P EST MOD 30 MIN: CPT | Performed by: PSYCHIATRY & NEUROLOGY

## 2019-06-27 PROCEDURE — A9270 NON-COVERED ITEM OR SERVICE: HCPCS | Performed by: INTERNAL MEDICINE

## 2019-06-27 RX ORDER — ARIPIPRAZOLE 10 MG/1
10 TABLET ORAL
Qty: 30 TAB | Refills: 0 | Status: SHIPPED | OUTPATIENT
Start: 2019-06-27

## 2019-06-27 RX ORDER — MIRTAZAPINE 7.5 MG/1
7.5 TABLET, FILM COATED ORAL
Qty: 30 TAB | Refills: 0 | Status: SHIPPED | OUTPATIENT
Start: 2019-06-27

## 2019-06-27 RX ADMIN — LEVOTHYROXINE SODIUM 125 MCG: 125 TABLET ORAL at 05:39

## 2019-06-27 RX ADMIN — SERTRALINE HYDROCHLORIDE 50 MG: 50 TABLET ORAL at 05:38

## 2019-06-27 NOTE — DISCHARGE INSTRUCTIONS
Discharge Instructions    Discharged to home by car with relative. Discharged via walking, hospital escort: Refused.  Special equipment needed: Not Applicable    Be sure to schedule a follow-up appointment with your primary care doctor or any specialists as instructed.     Discharge Plan:   Diet Plan: Discussed  Activity Level: Discussed  Confirmed Follow up Appointment: Appointment Scheduled  Confirmed Symptoms Management: Discussed  Medication Reconciliation Updated: Yes  Pneumococcal Vaccine Administered/Refused: Not given - Patient refused pneumococcal vaccine  Influenza Vaccine Indication: Not indicated: Previously immunized this influenza season and > 8 years of age    I understand that a diet low in cholesterol, fat, and sodium is recommended for good health. Unless I have been given specific instructions below for another diet, I accept this instruction as my diet prescription.   Other diet: Regular    Special Instructions: None    · Is patient discharged on Warfarin / Coumadin?   No     Depression / Suicide Risk    As you are discharged from this [facility] facility, it is important to learn how to keep safe from harming yourself.    Recognize the warning signs:  · Abrupt changes in personality, positive or negative- including increase in energy   · Giving away possessions  · Change in eating patterns- significant weight changes-  positive or negative  · Change in sleeping patterns- unable to sleep or sleeping all the time   · Unwillingness or inability to communicate  · Depression  · Unusual sadness, discouragement and loneliness  · Talk of wanting to die  · Neglect of personal appearance   · Rebelliousness- reckless behavior  · Withdrawal from people/activities they love  · Confusion- inability to concentrate     If you or a loved one observes any of these behaviors or has concerns about self-harm, here's what you can do:  · Talk about it- your feelings and reasons for harming yourself  · Remove any  means that you might use to hurt yourself (examples: pills, rope, extension cords, firearm)  · Get professional help from the community (Mental Health, Substance Abuse, psychological counseling)  · Do not be alone:Call your Safe Contact- someone whom you trust who will be there for you.  · Call your local CRISIS HOTLINE 149-5275 or 403-622-8868  · Call your local Children's Mobile Crisis Response Team [place] (694) 634-9818 or [facility]  · Call the toll free National Suicide Prevention Hotlines   · National Suicide Prevention Lifeline 069-467-JVBZ (7922)  · National Hope Line Network 800-SUICIDE (864-8549)      Discharge Instructions per Gia Abdi M.D.      DIET: regular diet     ACTIVITY: as tolerated     DIAGNOSIS: hallucinations, uncontrolled hypothyroidism     Return to ER if recurrence of hallucinations or thoughts of hurting yourself/others.                Hypothyroidism  Hypothyroidism is a disorder of the thyroid. The thyroid is a large gland that is located in the lower front of the neck. The thyroid releases hormones that control how the body works. With hypothyroidism, the thyroid does not make enough of these hormones.  What are the causes?  Causes of hypothyroidism may include:  · Viral infections.  · Pregnancy.  · Your own defense system (immune system) attacking your thyroid.  · Certain medicines.  · Birth defects.  · Past radiation treatments to your head or neck.  · Past treatment with radioactive iodine.  · Past surgical removal of part or all of your thyroid.  · Problems with the gland that is located in the center of your brain (pituitary).  What are the signs or symptoms?  Signs and symptoms of hypothyroidism may include:  · Feeling as though you have no energy (lethargy).  · Inability to tolerate cold.  · Weight gain that is not explained by a change in diet or exercise habits.  · Dry skin.  · Coarse hair.  · Menstrual irregularity.  · Slowing of thought  processes.  · Constipation.  · Sadness or depression.  How is this diagnosed?  Your health care provider may diagnose hypothyroidism with blood tests and ultrasound tests.  How is this treated?  Hypothyroidism is treated with medicine that replaces the hormones that your body does not make. After you begin treatment, it may take several weeks for symptoms to go away.  Follow these instructions at home:  · Take medicines only as directed by your health care provider.  · If you start taking any new medicines, tell your health care provider.  · Keep all follow-up visits as directed by your health care provider. This is important. As your condition improves, your dosage needs may change. You will need to have blood tests regularly so that your health care provider can watch your condition.  Contact a health care provider if:  · Your symptoms do not get better with treatment.  · You are taking thyroid replacement medicine and:  ¨ You sweat excessively.  ¨ You have tremors.  ¨ You feel anxious.  ¨ You lose weight rapidly.  ¨ You cannot tolerate heat.  ¨ You have emotional swings.  ¨ You have diarrhea.  ¨ You feel weak.  Get help right away if:  · You develop chest pain.  · You develop an irregular heartbeat.  · You develop a rapid heartbeat.  This information is not intended to replace advice given to you by your health care provider. Make sure you discuss any questions you have with your health care provider.  Document Released: 12/18/2006 Document Revised: 05/25/2017 Document Reviewed: 05/05/2015  [employer] Interactive Patient Education © 2017 [employer]

## 2019-06-27 NOTE — DISCHARGE SUMMARY
Discharge Summary    CHIEF COMPLAINT ON ADMISSION  Chief Complaint   Patient presents with   • Medical Clearance     from [facility]   • Unable to Sleep     [id number]       Reason for Admission  EMS     Admission Date  6/23/2019    CODE STATUS  Full Code    HPI & HOSPITAL COURSE    64 y.o. female with liver cirrhosis, Crohn's disease, hepatitis C, hypothyroidism, admitted 6/23/2019 with hallucination which started about a month ago.  She went to [facility], where she was found to have significantly elevated TSH of 20.95.  Patient admitted to not taking her Synthroid for a month now, and just restarted 2 days ago.  Psychiatry was consulted and started her on zoloft, remeron, and abilify.  Synthroid dose was resumed.  Upon evaluation by psychiatry her mood appears to be better and patient is ok to dc home with new meds and close outpatient follow up.       Therefore, she is discharged in good and stable condition to home with close outpatient follow-up.    The patient met 2-midnight criteria for an inpatient stay at the time of discharge.    Discharge Date  6/27/19    FOLLOW UP ITEMS POST DISCHARGE  F/u with outpatient pscyhiatry  Repeat TSH and FT4 in 6 weeks    DISCHARGE DIAGNOSES  Principal Problem:    Hallucinations POA: Yes  Active Problems:    Hypothyroidism POA: Yes    Severe major depression with psychotic features (HCC) POA: Yes    Hypokalemia POA: Yes    Pancytopenia (HCC) POA: Yes    Thrombocytopenia (HCC) POA: Yes    Serum total bilirubin elevated POA: Yes    Liver cirrhosis due to hepatitis C(HCC) POA: Yes  Resolved Problems:    * No resolved hospital problems. *      FOLLOW UP  No future appointments.  Gregorio Giraldo M.D.  [address]  [id number]  [id number]  [facility] [address]  [phone]    Go on 7/1/2019  PLEASE ARRIVE AT 1:45PM FOR YOUR 2:00PM APPOINTMENT. THANK YOU      MEDICATIONS ON DISCHARGE     Medication List      START taking these medications      Instructions   ARIPiprazole 10 MG  Tabs  Commonly known as:  ABILIFY   Take 1 Tab by mouth every bedtime.  Dose:  10 mg     mirtazapine 7.5 MG tablet  Commonly known as:  REMERON   Take 1 Tab by mouth every bedtime.  Dose:  7.5 mg     sertraline 50 MG Tabs  Commonly known as:  ZOLOFT   Take 1/2 tablet by mouth for 2 days then increase to 1 tab by mouth daily. May take with or without food.        CONTINUE taking these medications      Instructions   bisacodyl 10 MG Supp  Commonly known as:  DULCOLAX   Insert 10 mg in rectum 1 time daily as needed.  Dose:  10 mg     cloNIDine 0.1 MG Tabs  Commonly known as:  CATAPRES   Take 0.1 mg by mouth every 6 hours as needed (for bp).  Dose:  0.1 mg     ibuprofen 400 MG Tabs  Commonly known as:  MOTRIN   Take 400 mg by mouth every 6 hours as needed (for pain).  Dose:  400 mg     * levothyroxine 100 MCG Tabs  Commonly known as:  SYNTHROID   Take 100 mcg by mouth Every morning on an empty stomach. = 125 mcg once daily  Dose:  100 mcg     * levothyroxine 25 MCG Tabs  Commonly known as:  SYNTHROID   Take 25 mcg by mouth Every morning on an empty stomach. = 125 mcg once daily  Dose:  25 mcg     magnesium hydroxide 400 MG/5ML Susp  Commonly known as:  MILK OF MAGNESIA   Take 30 mL by mouth every four hours as needed (for heartburn or constipation).  Dose:  30 mL        * This list has 2 medication(s) that are the same as other medications prescribed for you. Read the directions carefully, and ask your doctor or other care provider to review them with you.            STOP taking these medications    hydrOXYzine pamoate 50 MG Caps  Commonly known as:  VISTARIL     QUEtiapine 25 MG Tabs  Commonly known as:  SEROQUEL     traZODone 50 MG Tabs  Commonly known as:  DESYREL            Allergies  No Known Allergies    DIET  Orders Placed This Encounter   Procedures   • Diet Order Regular     Standing Status:   Standing     Number of Occurrences:   1     Order Specific Question:   Diet:     Answer:   Regular [1]        ACTIVITY  As tolerated.  Weight bearing as tolerated    CONSULTATIONS  psychiatry    PROCEDURES  none    LABORATORY  Lab Results   Component Value Date    SODIUM 143 06/25/2019    POTASSIUM 4.3 06/25/2019    CHLORIDE 115 (H) 06/25/2019    CO2 20 06/25/2019    GLUCOSE 76 06/25/2019    BUN 12 06/25/2019    CREATININE 0.64 06/25/2019        Lab Results   Component Value Date    WBC 2.5 (LL) 06/25/2019    HEMOGLOBIN 11.6 (L) 06/25/2019    HEMATOCRIT 35.8 (L) 06/25/2019    PLATELETCT 56 (L) 06/25/2019        Total time of the discharge process exceeds 30 minutes.

## 2019-06-27 NOTE — PROGRESS NOTES
"Assumed care at 1900. Received report from RN. Patient is AOx4 flat affect.  at bedside. Patient stating she thinks she is sleeping better. No complaints of pain. 10x10 for 6/27. Assessment complete. Labs reviewed. Patient and RN discussed plan of care. Patient questions answered. Patient needs are met at this time. Bed in lowest and locked position. Call light is within reach. Hourly rounding in place. /58   Pulse 61   Temp 37.1 °C (98.8 °F) (Temporal)   Resp 18   Ht 1.626 m (5' 4\")   Wt 66.5 kg (146 lb 9.7 oz)   SpO2 96%   Breastfeeding? No   BMI 25.16 kg/m²       "

## 2019-06-27 NOTE — CARE PLAN
Problem: Safety  Goal: Will remain free from injury  Outcome: PROGRESSING AS EXPECTED  Bed on lowest position, call light within reach, patient educated about use of call light and safety precautions.     Problem: Discharge Barriers/Planning  Goal: Patient's continuum of care needs will be met  Outcome: PROGRESSING AS EXPECTED  Patient to be going home at discharge.

## 2019-06-27 NOTE — PSYCHIATRY
"PSYCHIATRIC FOLLOW-UP:(established) interview started at 1300 and ended at 1415. It started with the patient. And .   60 min  [id number]  Was in  psychotherapy with .   *Reason for admission:   pt was admitted at [facility] and transferred to [facility] for \"medical clearance\"       *Legal Hold Status on Admission:  NA                 *HPI: Pt and : no psychosis, feels better, \"a 6\" with anxiety/depression, eating, slept, no side effects. Would prefer to go home.     Psychotherapy with  in regards to patient care at home: warning signs of worsening, strategies such as watching her take her meds, who will stay with pt and who will go to the grocery store, dtr's effect on pt. How to handle that. 's anxiety over losing pt,  Has lost another wife to illness, how medical and psychiatric illness interacts with each other, how to take this into consideration regarding appetite, exercise for her, encouraging vs tolerance, etc if pt needs to return, calling crisis line, police, 's fears about how she might react. Assured him that he clearly knows what to do, he is observant, thoughtful and analytical. Recommended psychotherapy for him as he too is suffering. He had many questions that were addressed.    Pt and  together: medication management, risks/benefits/expectations. She agrees she will answer his questions directly and not spare him as he too is involved in the process, when to return here, that PCP will follow for now, that if she does get worse the police could get involved if she is unwilling to work with  to seek help. To summarize, she should continue to improve. She agrees.     *Psychiatric Examination:  Vitals:Blood pressure 115/61, pulse 63, temperature 36.8 °C (98.2 °F), temperature source Temporal, resp. rate 16, height 1.626 m (5' 4\"), weight 66.5 kg (146 lb 9.7 oz), SpO2 93 %, not currently breastfeeding.  General Appearance: good eye contact  Abnormal " "Movements: none noted  Gait and Posture: sitting up   Speech: soft, minimal  Thought processes:slightly slowed  Associations: linear  Abnormal or Psychotic Thoughts: none  Judgement and Insight: improved  Orientation: grossly  Recent and Remote Memory: grossly  Attention Span and Concentration: intact  Language: fluid  Fund of Knowledge:not tested  Mood and Affect: \"6\"/blunted  SI/HI:denies      *ASSESSMENT/PLAN:  1. Major depression severe with anxiety and psychosis  -increase zoloft: script for 100 mg. Take 1/2 for a couple more days then increased   -continue remeron   -increase abilify    -discussed med regimen, risks/benefits/expectations      2.   Medical   -elevated NH3: 60  -hx of cirrhosis and ascitics  -Hep C treated with harvoni   -hypothyroidism: being replaced.  -pancytopenia    *Legal hold: NA. They need a release to forward records to PCP. Referrals for mental health.  notified.                *Signing off  *Thank you for the consult      "

## 2019-06-27 NOTE — CARE PLAN
Problem: Communication  Goal: The ability to communicate needs accurately and effectively will improve  Outcome: PROGRESSING AS EXPECTED  All patient questions/concerns answered at this time.     Problem: Venous Thromboembolism (VTW)/Deep Vein Thrombosis (DVT) Prevention:  Goal: Patient will participate in Venous Thrombosis (VTE)/Deep Vein Thrombosis (DVT)Prevention Measures  Outcome: PROGRESSING AS EXPECTED  Patient receiving Lovenox; administered as per MAR.

## 2019-06-28 LAB — EKG IMPRESSION: NORMAL

## 2019-06-28 NOTE — PROGRESS NOTES
Pt has been given discharge paperwork and prescriptions.  Pt verbalized understanding of paperwork and of additions/changes to medication regimen.  Pt PIV d/c'd, tip intact.  Pt leaving via car with relative to home.

## 2019-07-08 ENCOUNTER — TELEPHONE (OUTPATIENT)
Dept: TRANSPLANT | Facility: MEDICAL CENTER | Age: 64
End: 2019-07-08

## 2019-07-08 NOTE — TELEPHONE ENCOUNTER
Called and LM for pt to schedule a f/u visit with Dr. De La Vega and to follow up on any changes made with her cardiologist.     Requested call back by pt.

## 2019-07-20 ENCOUNTER — HOSPITAL ENCOUNTER (INPATIENT)
Dept: HOSPITAL 8 - ED | Age: 64
LOS: 3 days | Discharge: HOME | DRG: 432 | End: 2019-07-23
Attending: INTERNAL MEDICINE | Admitting: INTERNAL MEDICINE
Payer: MEDICARE

## 2019-07-20 VITALS — DIASTOLIC BLOOD PRESSURE: 68 MMHG | SYSTOLIC BLOOD PRESSURE: 129 MMHG

## 2019-07-20 VITALS — WEIGHT: 156.09 LBS | HEIGHT: 64 IN | BODY MASS INDEX: 26.65 KG/M2

## 2019-07-20 VITALS — SYSTOLIC BLOOD PRESSURE: 107 MMHG | DIASTOLIC BLOOD PRESSURE: 65 MMHG

## 2019-07-20 VITALS — SYSTOLIC BLOOD PRESSURE: 106 MMHG | DIASTOLIC BLOOD PRESSURE: 60 MMHG

## 2019-07-20 VITALS — DIASTOLIC BLOOD PRESSURE: 66 MMHG | SYSTOLIC BLOOD PRESSURE: 119 MMHG

## 2019-07-20 VITALS — DIASTOLIC BLOOD PRESSURE: 66 MMHG | SYSTOLIC BLOOD PRESSURE: 105 MMHG

## 2019-07-20 VITALS — DIASTOLIC BLOOD PRESSURE: 57 MMHG | SYSTOLIC BLOOD PRESSURE: 100 MMHG

## 2019-07-20 DIAGNOSIS — F32.9: ICD-10-CM

## 2019-07-20 DIAGNOSIS — A04.72: ICD-10-CM

## 2019-07-20 DIAGNOSIS — E86.1: ICD-10-CM

## 2019-07-20 DIAGNOSIS — K74.69: Primary | ICD-10-CM

## 2019-07-20 DIAGNOSIS — I10: ICD-10-CM

## 2019-07-20 DIAGNOSIS — I27.20: ICD-10-CM

## 2019-07-20 DIAGNOSIS — K64.2: ICD-10-CM

## 2019-07-20 DIAGNOSIS — K76.89: ICD-10-CM

## 2019-07-20 DIAGNOSIS — E03.9: ICD-10-CM

## 2019-07-20 DIAGNOSIS — B18.2: ICD-10-CM

## 2019-07-20 DIAGNOSIS — I86.4: ICD-10-CM

## 2019-07-20 DIAGNOSIS — K50.10: ICD-10-CM

## 2019-07-20 DIAGNOSIS — I85.11: ICD-10-CM

## 2019-07-20 DIAGNOSIS — Z90.710: ICD-10-CM

## 2019-07-20 DIAGNOSIS — D62: ICD-10-CM

## 2019-07-20 DIAGNOSIS — R18.8: ICD-10-CM

## 2019-07-20 DIAGNOSIS — K31.89: ICD-10-CM

## 2019-07-20 DIAGNOSIS — Z90.49: ICD-10-CM

## 2019-07-20 DIAGNOSIS — K76.6: ICD-10-CM

## 2019-07-20 LAB
ALBUMIN SERPL-MCNC: 2.6 G/DL (ref 3.4–5)
ALP SERPL-CCNC: 80 U/L (ref 45–117)
ALT SERPL-CCNC: 26 U/L (ref 12–78)
ANION GAP SERPL CALC-SCNC: 8 MMOL/L (ref 5–15)
APTT BLD: 27 SECONDS (ref 25–31)
BASOPHILS # BLD AUTO: 0.04 X10^3/UL (ref 0–0.1)
BASOPHILS NFR BLD AUTO: 1 % (ref 0–1)
BILIRUB SERPL-MCNC: 1.2 MG/DL (ref 0.2–1)
CALCIUM SERPL-MCNC: 8.4 MG/DL (ref 8.5–10.1)
CHLORIDE SERPL-SCNC: 118 MMOL/L (ref 98–107)
CLOSTRIDIUM DIFFICILE ANTIGEN: POSITIVE
CLOSTRIDIUM DIFFICILE TOXIN: POSITIVE
CREAT SERPL-MCNC: 0.65 MG/DL (ref 0.55–1.02)
CULTURE INDICATED?: NO
EOSINOPHIL # BLD AUTO: 0.13 X10^3/UL (ref 0–0.4)
EOSINOPHIL NFR BLD AUTO: 2 % (ref 1–7)
ERYTHROCYTE [DISTWIDTH] IN BLOOD BY AUTOMATED COUNT: 19.1 % (ref 9.6–15.2)
INR PPP: 1.41 (ref 0.93–1.1)
LYMPHOCYTES # BLD AUTO: 0.89 X10^3/UL (ref 1–3.4)
LYMPHOCYTES NFR BLD AUTO: 12 % (ref 22–44)
MCH RBC QN AUTO: 27.7 PG (ref 27–34.8)
MCHC RBC AUTO-ENTMCNC: 32.2 G/DL (ref 32.4–35.8)
MCV RBC AUTO: 85.8 FL (ref 80–100)
MD: NO
MICROSCOPIC: (no result)
MONOCYTES # BLD AUTO: 0.56 X10^3/UL (ref 0.2–0.8)
MONOCYTES NFR BLD AUTO: 7 % (ref 2–9)
NEUTROPHILS # BLD AUTO: 5.93 X10^3/UL (ref 1.8–6.8)
NEUTROPHILS NFR BLD AUTO: 79 % (ref 42–75)
PLATELET # BLD AUTO: 138 X10^3/UL (ref 130–400)
PMV BLD AUTO: 9.8 FL (ref 7.4–10.4)
PROT SERPL-MCNC: 4.7 G/DL (ref 6.4–8.2)
PROTHROMBIN TIME: 14.6 SECONDS (ref 9.6–11.5)
RBC # BLD AUTO: 2.84 X10^6/UL (ref 3.82–5.3)

## 2019-07-20 PROCEDURE — 85730 THROMBOPLASTIN TIME PARTIAL: CPT

## 2019-07-20 PROCEDURE — 87324 CLOSTRIDIUM AG IA: CPT

## 2019-07-20 PROCEDURE — 30233N1 TRANSFUSION OF NONAUTOLOGOUS RED BLOOD CELLS INTO PERIPHERAL VEIN, PERCUTANEOUS APPROACH: ICD-10-PCS | Performed by: INTERNAL MEDICINE

## 2019-07-20 PROCEDURE — 88305 TISSUE EXAM BY PATHOLOGIST: CPT

## 2019-07-20 PROCEDURE — 85610 PROTHROMBIN TIME: CPT

## 2019-07-20 PROCEDURE — 86923 COMPATIBILITY TEST ELECTRIC: CPT

## 2019-07-20 PROCEDURE — 85670 THROMBIN TIME PLASMA: CPT

## 2019-07-20 PROCEDURE — 85018 HEMOGLOBIN: CPT

## 2019-07-20 PROCEDURE — 86850 RBC ANTIBODY SCREEN: CPT

## 2019-07-20 PROCEDURE — 85384 FIBRINOGEN ACTIVITY: CPT

## 2019-07-20 PROCEDURE — C9113 INJ PANTOPRAZOLE SODIUM, VIA: HCPCS

## 2019-07-20 PROCEDURE — 85014 HEMATOCRIT: CPT

## 2019-07-20 PROCEDURE — 86900 BLOOD TYPING SEROLOGIC ABO: CPT

## 2019-07-20 PROCEDURE — 85025 COMPLETE CBC W/AUTO DIFF WBC: CPT

## 2019-07-20 PROCEDURE — 36415 COLL VENOUS BLD VENIPUNCTURE: CPT

## 2019-07-20 PROCEDURE — 96374 THER/PROPH/DIAG INJ IV PUSH: CPT

## 2019-07-20 PROCEDURE — 80053 COMPREHEN METABOLIC PANEL: CPT

## 2019-07-20 PROCEDURE — 36430 TRANSFUSION BLD/BLD COMPNT: CPT

## 2019-07-20 PROCEDURE — P9016 RBC LEUKOCYTES REDUCED: HCPCS

## 2019-07-20 PROCEDURE — 81003 URINALYSIS AUTO W/O SCOPE: CPT

## 2019-07-20 RX ADMIN — PANTOPRAZOLE SODIUM SCH MLS/HR: 40 INJECTION, POWDER, FOR SOLUTION INTRAVENOUS at 16:09

## 2019-07-20 RX ADMIN — MIRTAZAPINE SCH MG: 15 TABLET, FILM COATED ORAL at 21:13

## 2019-07-20 RX ADMIN — PANTOPRAZOLE SODIUM SCH MLS/HR: 40 INJECTION, POWDER, FOR SOLUTION INTRAVENOUS at 23:25

## 2019-07-20 RX ADMIN — PANTOPRAZOLE SODIUM SCH MLS/HR: 40 INJECTION, POWDER, FOR SOLUTION INTRAVENOUS at 13:59

## 2019-07-20 RX ADMIN — SERTRALINE HYDROCHLORIDE SCH MG: 50 TABLET ORAL at 17:00

## 2019-07-20 RX ADMIN — TRAZODONE HYDROCHLORIDE SCH MG: 50 TABLET ORAL at 21:11

## 2019-07-20 RX ADMIN — VANCOMYCIN HYDROCHLORIDE SCH MG: 1 INJECTION, POWDER, LYOPHILIZED, FOR SOLUTION INTRAVENOUS at 21:13

## 2019-07-20 RX ADMIN — ARIPIPRAZOLE SCH MG: 10 TABLET ORAL at 21:13

## 2019-07-20 NOTE — NUR
LAB CALLED WITH POSITIVE CDIFF RESULTS, PT PLACED IN ISO, FAMILY MEMBERS 
EDUCATED ON ISOLATION PRECAUTIONS. PT MEDICATED PER MAR

## 2019-07-20 NOTE — NUR
PT RESTING ON CINDY MORENO IN TO UPDATE PT ON POC. PROTONIX GTT INFUSING, PT TO 
RECIEVE PRBC, CONSENT SIGNED.

## 2019-07-20 NOTE — NUR
PT WITH C/O N/V/D STARTING THURSDAY LATE AT NIGHT 7/18. PT STATES HER VOMIT AND 
DIARHHEA IS DARK RED/TARRY. PT DENIES CP, DIZZINESS, HA. STATES SHE FEELS 
GENERALLY WEAK. PT TO NIBP, CONT PULSE OX. PT TOLD DAUGHTER SHE HAD ABD PAIN, 
PT DENIES ABD PAIN AT THIS TIME.

## 2019-07-21 VITALS — SYSTOLIC BLOOD PRESSURE: 91 MMHG | DIASTOLIC BLOOD PRESSURE: 52 MMHG

## 2019-07-21 VITALS — SYSTOLIC BLOOD PRESSURE: 134 MMHG | DIASTOLIC BLOOD PRESSURE: 71 MMHG

## 2019-07-21 VITALS — DIASTOLIC BLOOD PRESSURE: 78 MMHG | SYSTOLIC BLOOD PRESSURE: 138 MMHG

## 2019-07-21 VITALS — SYSTOLIC BLOOD PRESSURE: 105 MMHG | DIASTOLIC BLOOD PRESSURE: 63 MMHG

## 2019-07-21 VITALS — DIASTOLIC BLOOD PRESSURE: 74 MMHG | SYSTOLIC BLOOD PRESSURE: 128 MMHG

## 2019-07-21 VITALS — SYSTOLIC BLOOD PRESSURE: 98 MMHG | DIASTOLIC BLOOD PRESSURE: 57 MMHG

## 2019-07-21 VITALS — DIASTOLIC BLOOD PRESSURE: 54 MMHG | SYSTOLIC BLOOD PRESSURE: 94 MMHG

## 2019-07-21 VITALS — SYSTOLIC BLOOD PRESSURE: 103 MMHG | DIASTOLIC BLOOD PRESSURE: 57 MMHG

## 2019-07-21 VITALS — DIASTOLIC BLOOD PRESSURE: 69 MMHG | SYSTOLIC BLOOD PRESSURE: 117 MMHG

## 2019-07-21 VITALS — DIASTOLIC BLOOD PRESSURE: 68 MMHG | SYSTOLIC BLOOD PRESSURE: 115 MMHG

## 2019-07-21 VITALS — DIASTOLIC BLOOD PRESSURE: 58 MMHG | SYSTOLIC BLOOD PRESSURE: 95 MMHG

## 2019-07-21 VITALS — SYSTOLIC BLOOD PRESSURE: 126 MMHG | DIASTOLIC BLOOD PRESSURE: 65 MMHG

## 2019-07-21 LAB
APTT 1H NP PPP: (no result) SECONDS (ref 25–31)
APTT BLD: 27 SECONDS (ref 25–31)
APTT IMM NP PPP: (no result) SECONDS (ref 25–31)
FIBRINOGEN PPP-MCNC: 158 MG/DL (ref 200–340)
PROTHROMBIN TIME: 14.3 SECONDS (ref 9.6–11.5)
THROMBIN TIME: 18 SECONDS (ref 14–19)

## 2019-07-21 PROCEDURE — 06L38CZ OCCLUSION OF ESOPHAGEAL VEIN WITH EXTRALUMINAL DEVICE, VIA NATURAL OR ARTIFICIAL OPENING ENDOSCOPIC: ICD-10-PCS | Performed by: INTERNAL MEDICINE

## 2019-07-21 PROCEDURE — 0DB68ZX EXCISION OF STOMACH, VIA NATURAL OR ARTIFICIAL OPENING ENDOSCOPIC, DIAGNOSTIC: ICD-10-PCS | Performed by: INTERNAL MEDICINE

## 2019-07-21 RX ADMIN — OCTREOTIDE ACETATE SCH MLS/HR: 500 INJECTION, SOLUTION INTRAVENOUS; SUBCUTANEOUS at 12:02

## 2019-07-21 RX ADMIN — PANTOPRAZOLE SODIUM SCH MLS/HR: 40 INJECTION, POWDER, FOR SOLUTION INTRAVENOUS at 12:02

## 2019-07-21 RX ADMIN — LEVOTHYROXINE SODIUM SCH MCG: 112 TABLET ORAL at 05:56

## 2019-07-21 RX ADMIN — POTASSIUM CHLORIDE SCH MLS/HR: 2 INJECTION, SOLUTION, CONCENTRATE INTRAVENOUS at 00:05

## 2019-07-21 RX ADMIN — POTASSIUM CHLORIDE SCH MLS/HR: 2 INJECTION, SOLUTION, CONCENTRATE INTRAVENOUS at 12:02

## 2019-07-21 RX ADMIN — VANCOMYCIN HYDROCHLORIDE SCH MG: 1 INJECTION, POWDER, LYOPHILIZED, FOR SOLUTION INTRAVENOUS at 16:43

## 2019-07-21 RX ADMIN — OCTREOTIDE ACETATE SCH MLS/HR: 500 INJECTION, SOLUTION INTRAVENOUS; SUBCUTANEOUS at 22:15

## 2019-07-21 RX ADMIN — VANCOMYCIN HYDROCHLORIDE SCH MG: 1 INJECTION, POWDER, LYOPHILIZED, FOR SOLUTION INTRAVENOUS at 12:03

## 2019-07-21 RX ADMIN — MIRTAZAPINE SCH MG: 15 TABLET, FILM COATED ORAL at 20:33

## 2019-07-21 RX ADMIN — CEFTRIAXONE SCH MLS/HR: 1 INJECTION, SOLUTION INTRAVENOUS at 23:38

## 2019-07-21 RX ADMIN — SERTRALINE HYDROCHLORIDE SCH MG: 50 TABLET ORAL at 09:00

## 2019-07-21 RX ADMIN — TRAZODONE HYDROCHLORIDE SCH MG: 50 TABLET ORAL at 20:33

## 2019-07-21 RX ADMIN — BUDESONIDE AND FORMOTEROL FUMARATE DIHYDRATE SCH MG: 160; 4.5 AEROSOL RESPIRATORY (INHALATION) at 09:00

## 2019-07-21 RX ADMIN — CEFTRIAXONE SCH MLS/HR: 1 INJECTION, SOLUTION INTRAVENOUS at 00:19

## 2019-07-21 RX ADMIN — MESALAMINE SCH GM: 1.2 TABLET, DELAYED RELEASE ORAL at 09:00

## 2019-07-21 RX ADMIN — ARIPIPRAZOLE SCH MG: 10 TABLET ORAL at 20:33

## 2019-07-21 RX ADMIN — VANCOMYCIN HYDROCHLORIDE SCH MG: 1 INJECTION, POWDER, LYOPHILIZED, FOR SOLUTION INTRAVENOUS at 20:33

## 2019-07-21 RX ADMIN — PANTOPRAZOLE SODIUM SCH MLS/HR: 40 INJECTION, POWDER, FOR SOLUTION INTRAVENOUS at 22:17

## 2019-07-21 RX ADMIN — VANCOMYCIN HYDROCHLORIDE SCH MG: 1 INJECTION, POWDER, LYOPHILIZED, FOR SOLUTION INTRAVENOUS at 05:55

## 2019-07-21 RX ADMIN — OCTREOTIDE ACETATE SCH MLS/HR: 500 INJECTION, SOLUTION INTRAVENOUS; SUBCUTANEOUS at 00:14

## 2019-07-22 VITALS — SYSTOLIC BLOOD PRESSURE: 100 MMHG | DIASTOLIC BLOOD PRESSURE: 56 MMHG

## 2019-07-22 VITALS — DIASTOLIC BLOOD PRESSURE: 63 MMHG | SYSTOLIC BLOOD PRESSURE: 112 MMHG

## 2019-07-22 VITALS — DIASTOLIC BLOOD PRESSURE: 58 MMHG | SYSTOLIC BLOOD PRESSURE: 105 MMHG

## 2019-07-22 LAB
BASOPHILS # BLD AUTO: 0.01 X10^3/UL (ref 0–0.1)
BASOPHILS NFR BLD AUTO: 0 % (ref 0–1)
EOSINOPHIL # BLD AUTO: 0.14 X10^3/UL (ref 0–0.4)
EOSINOPHIL NFR BLD AUTO: 5 % (ref 1–7)
ERYTHROCYTE [DISTWIDTH] IN BLOOD BY AUTOMATED COUNT: 16.5 % (ref 9.6–15.2)
LYMPHOCYTES # BLD AUTO: 0.51 X10^3/UL (ref 1–3.4)
LYMPHOCYTES NFR BLD AUTO: 18 % (ref 22–44)
MCH RBC QN AUTO: 28.4 PG (ref 27–34.8)
MCHC RBC AUTO-ENTMCNC: 32.7 G/DL (ref 32.4–35.8)
MCV RBC AUTO: 86.8 FL (ref 80–100)
MD: (no result)
MONOCYTES # BLD AUTO: 0.3 X10^3/UL (ref 0.2–0.8)
MONOCYTES NFR BLD AUTO: 11 % (ref 2–9)
NEUTROPHILS # BLD AUTO: 1.89 X10^3/UL (ref 1.8–6.8)
NEUTROPHILS NFR BLD AUTO: 67 % (ref 42–75)
PLATELET # BLD AUTO: 60 X10^3/UL (ref 130–400)
PMV BLD AUTO: 9.3 FL (ref 7.4–10.4)
PT IMM NP PPP: 11.4 SECONDS (ref 9.6–11.5)
RBC # BLD AUTO: 3.08 X10^6/UL (ref 3.82–5.3)

## 2019-07-22 RX ADMIN — VANCOMYCIN HYDROCHLORIDE SCH MG: 1 INJECTION, POWDER, LYOPHILIZED, FOR SOLUTION INTRAVENOUS at 10:31

## 2019-07-22 RX ADMIN — ARIPIPRAZOLE SCH MG: 10 TABLET ORAL at 20:49

## 2019-07-22 RX ADMIN — OCTREOTIDE ACETATE SCH MLS/HR: 500 INJECTION, SOLUTION INTRAVENOUS; SUBCUTANEOUS at 10:32

## 2019-07-22 RX ADMIN — LEVOTHYROXINE SODIUM SCH MCG: 112 TABLET ORAL at 06:04

## 2019-07-22 RX ADMIN — BUDESONIDE AND FORMOTEROL FUMARATE DIHYDRATE SCH MG: 160; 4.5 AEROSOL RESPIRATORY (INHALATION) at 09:00

## 2019-07-22 RX ADMIN — VANCOMYCIN HYDROCHLORIDE SCH MG: 1 INJECTION, POWDER, LYOPHILIZED, FOR SOLUTION INTRAVENOUS at 20:50

## 2019-07-22 RX ADMIN — POTASSIUM CHLORIDE SCH MLS/HR: 2 INJECTION, SOLUTION, CONCENTRATE INTRAVENOUS at 11:15

## 2019-07-22 RX ADMIN — SERTRALINE HYDROCHLORIDE SCH MG: 50 TABLET ORAL at 08:58

## 2019-07-22 RX ADMIN — VANCOMYCIN HYDROCHLORIDE SCH MG: 1 INJECTION, POWDER, LYOPHILIZED, FOR SOLUTION INTRAVENOUS at 16:00

## 2019-07-22 RX ADMIN — MESALAMINE SCH GM: 1.2 TABLET, DELAYED RELEASE ORAL at 08:58

## 2019-07-22 RX ADMIN — MIRTAZAPINE SCH MG: 15 TABLET, FILM COATED ORAL at 20:49

## 2019-07-22 RX ADMIN — VANCOMYCIN HYDROCHLORIDE SCH MG: 1 INJECTION, POWDER, LYOPHILIZED, FOR SOLUTION INTRAVENOUS at 06:04

## 2019-07-22 RX ADMIN — POTASSIUM CHLORIDE SCH MLS/HR: 2 INJECTION, SOLUTION, CONCENTRATE INTRAVENOUS at 01:00

## 2019-07-23 VITALS — SYSTOLIC BLOOD PRESSURE: 91 MMHG | DIASTOLIC BLOOD PRESSURE: 50 MMHG

## 2019-07-23 VITALS — SYSTOLIC BLOOD PRESSURE: 94 MMHG | DIASTOLIC BLOOD PRESSURE: 61 MMHG

## 2019-07-23 VITALS — SYSTOLIC BLOOD PRESSURE: 94 MMHG | DIASTOLIC BLOOD PRESSURE: 45 MMHG

## 2019-07-23 VITALS — DIASTOLIC BLOOD PRESSURE: 46 MMHG | SYSTOLIC BLOOD PRESSURE: 86 MMHG

## 2019-07-23 VITALS — DIASTOLIC BLOOD PRESSURE: 52 MMHG | SYSTOLIC BLOOD PRESSURE: 86 MMHG

## 2019-07-23 VITALS — SYSTOLIC BLOOD PRESSURE: 76 MMHG | DIASTOLIC BLOOD PRESSURE: 48 MMHG

## 2019-07-23 VITALS — SYSTOLIC BLOOD PRESSURE: 87 MMHG | DIASTOLIC BLOOD PRESSURE: 56 MMHG

## 2019-07-23 VITALS — DIASTOLIC BLOOD PRESSURE: 53 MMHG | SYSTOLIC BLOOD PRESSURE: 97 MMHG

## 2019-07-23 LAB
BASOPHILS # BLD AUTO: 0.01 X10^3/UL (ref 0–0.1)
BASOPHILS NFR BLD AUTO: 0 % (ref 0–1)
EOSINOPHIL # BLD AUTO: 0.16 X10^3/UL (ref 0–0.4)
EOSINOPHIL NFR BLD AUTO: 4 % (ref 1–7)
ERYTHROCYTE [DISTWIDTH] IN BLOOD BY AUTOMATED COUNT: 17.3 % (ref 9.6–15.2)
LYMPHOCYTES # BLD AUTO: 0.66 X10^3/UL (ref 1–3.4)
LYMPHOCYTES NFR BLD AUTO: 14 % (ref 22–44)
MCH RBC QN AUTO: 28.6 PG (ref 27–34.8)
MCHC RBC AUTO-ENTMCNC: 32.6 G/DL (ref 32.4–35.8)
MCV RBC AUTO: 87.9 FL (ref 80–100)
MD: (no result)
MONOCYTES # BLD AUTO: 0.42 X10^3/UL (ref 0.2–0.8)
MONOCYTES NFR BLD AUTO: 9 % (ref 2–9)
NEUTROPHILS # BLD AUTO: 3.36 X10^3/UL (ref 1.8–6.8)
NEUTROPHILS NFR BLD AUTO: 73 % (ref 42–75)
PLATELET # BLD AUTO: 74 X10^3/UL (ref 130–400)
PMV BLD AUTO: 9.7 FL (ref 7.4–10.4)
RBC # BLD AUTO: 3.02 X10^6/UL (ref 3.82–5.3)

## 2019-07-23 RX ADMIN — MESALAMINE SCH GM: 1.2 TABLET, DELAYED RELEASE ORAL at 08:18

## 2019-07-23 RX ADMIN — SERTRALINE HYDROCHLORIDE SCH MG: 50 TABLET ORAL at 08:18

## 2019-07-23 RX ADMIN — VANCOMYCIN HYDROCHLORIDE SCH MG: 1 INJECTION, POWDER, LYOPHILIZED, FOR SOLUTION INTRAVENOUS at 10:47

## 2019-07-23 RX ADMIN — LEVOTHYROXINE SODIUM SCH MCG: 112 TABLET ORAL at 05:54

## 2019-07-23 RX ADMIN — BUDESONIDE AND FORMOTEROL FUMARATE DIHYDRATE SCH MG: 160; 4.5 AEROSOL RESPIRATORY (INHALATION) at 08:18

## 2019-07-23 RX ADMIN — POTASSIUM CHLORIDE SCH MLS/HR: 2 INJECTION, SOLUTION, CONCENTRATE INTRAVENOUS at 10:47

## 2019-07-23 RX ADMIN — VANCOMYCIN HYDROCHLORIDE SCH MG: 1 INJECTION, POWDER, LYOPHILIZED, FOR SOLUTION INTRAVENOUS at 05:54

## 2019-11-06 ENCOUNTER — TELEPHONE (OUTPATIENT)
Dept: TRANSPLANT | Facility: MEDICAL CENTER | Age: 64
End: 2019-11-06

## 2019-11-06 NOTE — TELEPHONE ENCOUNTER
Called and spoke to Effie. Per Effie she is unable to get either of the medications that Dr. Payan had recommended due to price. Pt needs to be seen by Dr. De La Vega for hepatology f/u. Per Effie, she is in the process of scheduling an MRI ordered by Dr. Arnold and will be willing to schedule f/u appointment in January as she would like current events in her life to settle down. I informed Effie that once I get final [facility] clinic dates, I will call her back to schedule. Effie was urged to call this RN with any questions, concerns, needs and pt agreeable to plan.

## 2019-11-14 ENCOUNTER — APPOINTMENT (OUTPATIENT)
Dept: RADIOLOGY | Facility: MEDICAL CENTER | Age: 64
End: 2019-11-14
Attending: INTERNAL MEDICINE
Payer: MEDICARE

## 2019-11-14 DIAGNOSIS — K74.69 FLORID CIRRHOSIS (HCC): ICD-10-CM

## 2019-11-14 DIAGNOSIS — K50.818 CROHN'S DISEASE OF BOTH SMALL AND LG INT W OTH COMPLICATION (HCC): ICD-10-CM

## 2019-11-14 DIAGNOSIS — R19.7 DIARRHEA, UNSPECIFIED TYPE: ICD-10-CM

## 2019-11-14 PROCEDURE — 74183 MRI ABD W/O CNTR FLWD CNTR: CPT

## 2019-11-14 PROCEDURE — A9576 INJ PROHANCE MULTIPACK: HCPCS | Performed by: INTERNAL MEDICINE

## 2019-11-14 PROCEDURE — 700117 HCHG RX CONTRAST REV CODE 255: Performed by: INTERNAL MEDICINE

## 2019-11-14 RX ADMIN — GADOTERIDOL 17 ML: 279.3 INJECTION, SOLUTION INTRAVENOUS at 08:42

## 2020-02-21 ENCOUNTER — HOSPITAL ENCOUNTER (OUTPATIENT)
Dept: HOSPITAL 8 - STAR | Age: 65
Discharge: HOME | End: 2020-02-21
Attending: INTERNAL MEDICINE
Payer: MEDICARE

## 2020-02-21 DIAGNOSIS — Z01.818: Primary | ICD-10-CM

## 2020-02-21 PROCEDURE — 93005 ELECTROCARDIOGRAM TRACING: CPT

## 2020-02-25 ENCOUNTER — TELEPHONE (OUTPATIENT)
Dept: SURGERY | Facility: MEDICAL CENTER | Age: 65
End: 2020-02-25

## 2020-02-25 NOTE — TELEPHONE ENCOUNTER
Called and spoke to Effie. Per pt, she is still interested in being seen by Dr. De La Vega. A 3/4/20 clinic date was offered. She states that the 3/25/20 clinic date would work better but she is requesting this RN call next week to formally schedule.     This RN to call back for scheduling.

## 2020-02-28 ENCOUNTER — HOSPITAL ENCOUNTER (OUTPATIENT)
Dept: HOSPITAL 8 - OR | Age: 65
Discharge: HOME | End: 2020-02-28
Attending: INTERNAL MEDICINE
Payer: MEDICARE

## 2020-02-28 VITALS — WEIGHT: 174.39 LBS | BODY MASS INDEX: 29.77 KG/M2 | HEIGHT: 64 IN

## 2020-02-28 VITALS — SYSTOLIC BLOOD PRESSURE: 116 MMHG | DIASTOLIC BLOOD PRESSURE: 77 MMHG

## 2020-02-28 DIAGNOSIS — I10: ICD-10-CM

## 2020-02-28 DIAGNOSIS — Z86.010: ICD-10-CM

## 2020-02-28 DIAGNOSIS — K74.69: Primary | ICD-10-CM

## 2020-02-28 DIAGNOSIS — Z90.49: ICD-10-CM

## 2020-02-28 DIAGNOSIS — I85.10: ICD-10-CM

## 2020-02-28 DIAGNOSIS — K44.9: ICD-10-CM

## 2020-02-28 DIAGNOSIS — K31.89: ICD-10-CM

## 2020-02-28 DIAGNOSIS — K50.818: ICD-10-CM

## 2020-02-28 DIAGNOSIS — Z79.899: ICD-10-CM

## 2020-02-28 DIAGNOSIS — K21.9: ICD-10-CM

## 2020-02-28 DIAGNOSIS — B18.2: ICD-10-CM

## 2020-02-28 DIAGNOSIS — E03.9: ICD-10-CM

## 2020-02-28 DIAGNOSIS — Z79.890: ICD-10-CM

## 2020-02-28 DIAGNOSIS — I27.20: ICD-10-CM

## 2020-02-28 DIAGNOSIS — Z79.891: ICD-10-CM

## 2020-02-28 DIAGNOSIS — Z98.890: ICD-10-CM

## 2020-02-28 DIAGNOSIS — K76.6: ICD-10-CM

## 2020-02-28 DIAGNOSIS — R18.8: ICD-10-CM

## 2020-02-28 PROCEDURE — 43244 EGD VARICES LIGATION: CPT

## 2020-03-03 ENCOUNTER — TELEPHONE (OUTPATIENT)
Dept: TRANSPLANT | Facility: MEDICAL CENTER | Age: 65
End: 2020-03-03

## 2020-03-03 NOTE — TELEPHONE ENCOUNTER
"Called pt to schedule for clinic as discussed in previous note. Per pt she is \"not near her calendar to schedule\". She was able to write down this RN's phone number and states that she will call me back when she is home.  "

## 2020-03-25 ENCOUNTER — APPOINTMENT (OUTPATIENT)
Dept: TRANSPLANT | Facility: MEDICAL CENTER | Age: 65
End: 2020-03-25
Payer: MEDICARE

## 2020-04-14 ENCOUNTER — TELEPHONE (OUTPATIENT)
Dept: TRANSPLANT | Facility: MEDICAL CENTER | Age: 65
End: 2020-04-14

## 2020-04-14 NOTE — TELEPHONE ENCOUNTER
Called pt to offer a telemedicine visit to f/u with Dr. De La Vega. Pt is unsure if she is comfortable using telemedicine unless we were using [employer]. Pt was informed that [employer] is not the platform that [facility] is using. She states that she has her first telemedicine appointment scheduled with another provider next week and if that goes well she will reconsider and call back.

## 2020-05-25 ENCOUNTER — HOSPITAL ENCOUNTER (OUTPATIENT)
Dept: RADIOLOGY | Facility: MEDICAL CENTER | Age: 65
End: 2020-05-25
Attending: INTERNAL MEDICINE
Payer: MEDICARE

## 2020-05-25 DIAGNOSIS — R93.3 ABNORMAL DIGESTIVE SYSTEM DIAGNOSTIC IMAGING: ICD-10-CM

## 2020-05-25 DIAGNOSIS — D50.0 IRON DEFICIENCY ANEMIA SECONDARY TO BLOOD LOSS (CHRONIC): ICD-10-CM

## 2020-05-25 DIAGNOSIS — I27.20 PHT (PULMONARY HYPERTENSION) (HCC): ICD-10-CM

## 2020-05-25 DIAGNOSIS — I85.00 ESOPHAGEAL VARICES WITHOUT BLEEDING, UNSPECIFIED ESOPHAGEAL VARICES TYPE (HCC): ICD-10-CM

## 2020-05-25 DIAGNOSIS — R19.7 DIARRHEA, UNSPECIFIED TYPE: ICD-10-CM

## 2020-05-25 DIAGNOSIS — K76.82 ENCEPHALOPATHY, HEPATIC (HCC): ICD-10-CM

## 2020-05-25 DIAGNOSIS — K74.69 OTHER CIRRHOSIS OF LIVER (HCC): ICD-10-CM

## 2020-05-25 DIAGNOSIS — R18.8 OTHER ASCITES: ICD-10-CM

## 2020-05-25 DIAGNOSIS — K50.818 CROHN'S DISEASE OF BOTH SMALL AND LG INT W OTH COMPLICATION (HCC): ICD-10-CM

## 2020-05-25 DIAGNOSIS — R04.0 BLEEDING FROM THE NOSE: ICD-10-CM

## 2020-05-25 PROCEDURE — 74183 MRI ABD W/O CNTR FLWD CNTR: CPT

## 2020-05-25 PROCEDURE — 700117 HCHG RX CONTRAST REV CODE 255: Performed by: INTERNAL MEDICINE

## 2020-05-25 PROCEDURE — A9576 INJ PROHANCE MULTIPACK: HCPCS | Performed by: INTERNAL MEDICINE

## 2020-05-25 RX ADMIN — GADOTERIDOL 15 ML: 279.3 INJECTION, SOLUTION INTRAVENOUS at 10:30

## 2020-07-01 ENCOUNTER — HOSPITAL ENCOUNTER (OUTPATIENT)
Dept: HOSPITAL 8 - STAR | Age: 65
Discharge: HOME | End: 2020-07-01
Attending: INTERNAL MEDICINE
Payer: MEDICARE

## 2020-07-01 DIAGNOSIS — K50.818: ICD-10-CM

## 2020-07-01 DIAGNOSIS — R94.31: ICD-10-CM

## 2020-07-01 DIAGNOSIS — K74.69: ICD-10-CM

## 2020-07-01 DIAGNOSIS — Z01.818: Primary | ICD-10-CM

## 2020-07-01 DIAGNOSIS — R93.3: ICD-10-CM

## 2020-07-01 LAB
ALBUMIN SERPL-MCNC: 3.1 G/DL (ref 3.4–5)
ALP SERPL-CCNC: 190 U/L (ref 45–117)
ALT SERPL-CCNC: 24 U/L (ref 12–78)
ANION GAP SERPL CALC-SCNC: 3 MMOL/L (ref 5–15)
BASOPHILS # BLD AUTO: 0.01 X10^3/UL (ref 0–0.1)
BASOPHILS NFR BLD AUTO: 1 % (ref 0–1)
BILIRUB SERPL-MCNC: 1.4 MG/DL (ref 0.2–1)
CALCIUM SERPL-MCNC: 9 MG/DL (ref 8.5–10.1)
CHLORIDE SERPL-SCNC: 116 MMOL/L (ref 98–107)
CREAT SERPL-MCNC: 0.75 MG/DL (ref 0.55–1.02)
EOSINOPHIL # BLD AUTO: 0.07 X10^3/UL (ref 0–0.4)
EOSINOPHIL NFR BLD AUTO: 4 % (ref 1–7)
ERYTHROCYTE [DISTWIDTH] IN BLOOD BY AUTOMATED COUNT: 17.9 % (ref 9.6–15.2)
LYMPHOCYTES # BLD AUTO: 0.38 X10^3/UL (ref 1–3.4)
LYMPHOCYTES NFR BLD AUTO: 19 % (ref 22–44)
MCH RBC QN AUTO: 28.5 PG (ref 27–34.8)
MCHC RBC AUTO-ENTMCNC: 32.7 G/DL (ref 32.4–35.8)
MCV RBC AUTO: 87.2 FL (ref 80–100)
MD: (no result)
MONOCYTES # BLD AUTO: 0.19 X10^3/UL (ref 0.2–0.8)
MONOCYTES NFR BLD AUTO: 10 % (ref 2–9)
NEUTROPHILS # BLD AUTO: 1.36 X10^3/UL (ref 1.8–6.8)
NEUTROPHILS NFR BLD AUTO: 67 % (ref 42–75)
PLATELET # BLD AUTO: 64 X10^3/UL (ref 130–400)
PMV BLD AUTO: 10.5 FL (ref 7.4–10.4)
PROT SERPL-MCNC: 6.2 G/DL (ref 6.4–8.2)
RBC # BLD AUTO: 4.19 X10^6/UL (ref 3.82–5.3)

## 2020-07-01 PROCEDURE — 85025 COMPLETE CBC W/AUTO DIFF WBC: CPT

## 2020-07-01 PROCEDURE — 80053 COMPREHEN METABOLIC PANEL: CPT

## 2020-07-01 PROCEDURE — 36415 COLL VENOUS BLD VENIPUNCTURE: CPT

## 2020-07-01 PROCEDURE — 93005 ELECTROCARDIOGRAM TRACING: CPT

## 2020-07-09 ENCOUNTER — HOSPITAL ENCOUNTER (OUTPATIENT)
Dept: HOSPITAL 8 - OUT | Age: 65
Discharge: HOME | End: 2020-07-09
Attending: INTERNAL MEDICINE
Payer: MEDICARE

## 2020-07-09 VITALS — WEIGHT: 167.55 LBS | BODY MASS INDEX: 28.6 KG/M2 | HEIGHT: 64 IN

## 2020-07-09 VITALS — DIASTOLIC BLOOD PRESSURE: 74 MMHG | SYSTOLIC BLOOD PRESSURE: 113 MMHG

## 2020-07-09 DIAGNOSIS — Z90.49: ICD-10-CM

## 2020-07-09 DIAGNOSIS — I85.10: ICD-10-CM

## 2020-07-09 DIAGNOSIS — K74.69: Primary | ICD-10-CM

## 2020-07-09 DIAGNOSIS — I27.20: ICD-10-CM

## 2020-07-09 DIAGNOSIS — K76.6: ICD-10-CM

## 2020-07-09 DIAGNOSIS — G47.30: ICD-10-CM

## 2020-07-09 DIAGNOSIS — Z98.890: ICD-10-CM

## 2020-07-09 DIAGNOSIS — K21.9: ICD-10-CM

## 2020-07-09 DIAGNOSIS — F31.9: ICD-10-CM

## 2020-07-09 DIAGNOSIS — K31.89: ICD-10-CM

## 2020-07-09 DIAGNOSIS — Z90.710: ICD-10-CM

## 2020-07-09 DIAGNOSIS — E03.9: ICD-10-CM

## 2020-07-09 DIAGNOSIS — Z11.59: ICD-10-CM

## 2020-07-09 DIAGNOSIS — Z79.899: ICD-10-CM

## 2020-07-09 PROCEDURE — 87635 SARS-COV-2 COVID-19 AMP PRB: CPT

## 2020-07-09 PROCEDURE — 36415 COLL VENOUS BLD VENIPUNCTURE: CPT

## 2020-07-09 PROCEDURE — 43235 EGD DIAGNOSTIC BRUSH WASH: CPT

## 2021-03-03 DIAGNOSIS — Z23 NEED FOR VACCINATION: ICD-10-CM

## 2021-05-21 ENCOUNTER — HOSPITAL ENCOUNTER (OUTPATIENT)
Dept: HOSPITAL 8 - CFH | Age: 66
Discharge: HOME | End: 2021-05-21
Attending: INTERNAL MEDICINE
Payer: MEDICARE

## 2021-05-21 DIAGNOSIS — M81.0: Primary | ICD-10-CM

## 2021-05-21 PROCEDURE — 77080 DXA BONE DENSITY AXIAL: CPT

## 2021-06-23 ENCOUNTER — HOSPITAL ENCOUNTER (OUTPATIENT)
Dept: RADIOLOGY | Facility: MEDICAL CENTER | Age: 66
End: 2021-06-23
Attending: INTERNAL MEDICINE
Payer: MEDICARE

## 2021-06-23 DIAGNOSIS — J91.8 PLEURAL EFFUSION ASSOCIATED WITH HEPATIC DISORDER: ICD-10-CM

## 2021-06-23 DIAGNOSIS — K76.9 PLEURAL EFFUSION ASSOCIATED WITH HEPATIC DISORDER: ICD-10-CM

## 2021-06-23 DIAGNOSIS — K74.60 HEPATIC CIRRHOSIS, UNSPECIFIED HEPATIC CIRRHOSIS TYPE, UNSPECIFIED WHETHER ASCITES PRESENT (HCC): ICD-10-CM

## 2021-06-23 PROCEDURE — 76705 ECHO EXAM OF ABDOMEN: CPT

## 2021-07-06 ENCOUNTER — HOSPITAL ENCOUNTER (EMERGENCY)
Dept: HOSPITAL 8 - ED | Age: 66
Discharge: HOME | End: 2021-07-06
Payer: MEDICARE

## 2021-07-06 VITALS — DIASTOLIC BLOOD PRESSURE: 71 MMHG | SYSTOLIC BLOOD PRESSURE: 121 MMHG

## 2021-07-06 VITALS — WEIGHT: 179.24 LBS | HEIGHT: 64 IN | BODY MASS INDEX: 30.6 KG/M2
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
## 2021-07-06 DIAGNOSIS — S42.212A: Primary | ICD-10-CM

## 2021-07-06 DIAGNOSIS — Z90.710: ICD-10-CM

## 2021-07-06 DIAGNOSIS — Z79.899: ICD-10-CM

## 2021-07-06 DIAGNOSIS — Z90.89: ICD-10-CM
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
## 2021-07-06 DIAGNOSIS — I10: ICD-10-CM
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
## 2021-07-06 DIAGNOSIS — R94.31: ICD-10-CM

## 2021-07-06 DIAGNOSIS — Z90.49: ICD-10-CM

## 2021-07-06 DIAGNOSIS — E03.9: ICD-10-CM

## 2021-07-06 LAB
ALBUMIN SERPL-MCNC: 2.9 G/DL (ref 3.4–5)
ALP SERPL-CCNC: 143 U/L (ref 45–117)
ALT SERPL-CCNC: 19 U/L (ref 12–78)
ANION GAP SERPL CALC-SCNC: 3 MMOL/L (ref 5–15)
BILIRUB SERPL-MCNC: 1.8 MG/DL (ref 0.2–1)
CALCIUM SERPL-MCNC: 8.8 MG/DL (ref 8.5–10.1)
CHLORIDE SERPL-SCNC: 115 MMOL/L (ref 98–107)
CREAT SERPL-MCNC: 0.69 MG/DL (ref 0.55–1.02)
PROT SERPL-MCNC: 5.4 G/DL (ref 6.4–8.2)

## 2021-07-06 PROCEDURE — 96372 THER/PROPH/DIAG INJ SC/IM: CPT

## 2021-07-06 PROCEDURE — 73030 X-RAY EXAM OF SHOULDER: CPT

## 2021-07-06 PROCEDURE — 93005 ELECTROCARDIOGRAM TRACING: CPT

## 2021-07-06 PROCEDURE — 80053 COMPREHEN METABOLIC PANEL: CPT

## 2021-07-06 PROCEDURE — 36415 COLL VENOUS BLD VENIPUNCTURE: CPT

## 2021-07-06 PROCEDURE — 82306 VITAMIN D 25 HYDROXY: CPT

## 2021-07-06 PROCEDURE — 99285 EMERGENCY DEPT VISIT HI MDM: CPT

## 2021-07-06 PROCEDURE — 73060 X-RAY EXAM OF HUMERUS: CPT

## 2021-09-09 ENCOUNTER — HOSPITAL ENCOUNTER (INPATIENT)
Dept: HOSPITAL 8 - ED | Age: 66
LOS: 2 days | Discharge: HOME | DRG: 442 | End: 2021-09-11
Attending: INTERNAL MEDICINE | Admitting: INTERNAL MEDICINE
Payer: MEDICARE

## 2021-09-09 VITALS — HEIGHT: 64 IN | WEIGHT: 158.51 LBS | BODY MASS INDEX: 27.06 KG/M2

## 2021-09-09 VITALS — SYSTOLIC BLOOD PRESSURE: 121 MMHG | DIASTOLIC BLOOD PRESSURE: 69 MMHG

## 2021-09-09 DIAGNOSIS — K72.00: Primary | ICD-10-CM

## 2021-09-09 DIAGNOSIS — E87.6: ICD-10-CM

## 2021-09-09 DIAGNOSIS — E03.9: ICD-10-CM

## 2021-09-09 DIAGNOSIS — I10: ICD-10-CM

## 2021-09-09 DIAGNOSIS — Z86.19: ICD-10-CM

## 2021-09-09 DIAGNOSIS — K50.90: ICD-10-CM

## 2021-09-09 DIAGNOSIS — I27.21: ICD-10-CM

## 2021-09-09 DIAGNOSIS — D50.9: ICD-10-CM

## 2021-09-09 DIAGNOSIS — Z90.49: ICD-10-CM

## 2021-09-09 DIAGNOSIS — K72.10: ICD-10-CM

## 2021-09-09 DIAGNOSIS — K92.1: ICD-10-CM

## 2021-09-09 DIAGNOSIS — K31.89: ICD-10-CM

## 2021-09-09 DIAGNOSIS — K76.6: ICD-10-CM

## 2021-09-09 DIAGNOSIS — R13.10: ICD-10-CM

## 2021-09-09 DIAGNOSIS — Z90.710: ICD-10-CM

## 2021-09-09 DIAGNOSIS — K74.60: ICD-10-CM

## 2021-09-09 DIAGNOSIS — D61.818: ICD-10-CM

## 2021-09-09 LAB
<PLATELET ESTIMATE>: (no result)
<PLT MORPHOLOGY>: (no result)
ALBUMIN SERPL-MCNC: 2.9 G/DL (ref 3.4–5)
ALP SERPL-CCNC: 133 U/L (ref 45–117)
ALT SERPL-CCNC: 28 U/L (ref 12–78)
ANION GAP SERPL CALC-SCNC: 7 MMOL/L (ref 5–15)
ANISOCYTOSIS BLD QL SMEAR: (no result)
BILIRUB SERPL-MCNC: 1.8 MG/DL (ref 0.2–1)
CALCIUM SERPL-MCNC: 9.5 MG/DL (ref 8.5–10.1)
CHLORIDE SERPL-SCNC: 103 MMOL/L (ref 98–107)
CREAT SERPL-MCNC: 0.92 MG/DL (ref 0.55–1.02)
EOS#(MANUAL): 0.07 X10^3/UL (ref 0–0.4)
EOS% (MANUAL): 2 % (ref 1–7)
ERYTHROCYTE [DISTWIDTH] IN BLOOD BY AUTOMATED COUNT: 26.4 % (ref 9.6–15.2)
GFR SERPL CREATININE-BSD FRML MDRD: POSITIVE ML/MIN/{1.73_M2}
HYPOCHROMIA BLD QL SMEAR: (no result)
INR PPP: 1.38 (ref 0.93–1.1)
LYMPH#(MANUAL): 0.37 X10^3/UL (ref 1–3.4)
LYMPHS% (MANUAL): 11 % (ref 22–44)
MCH RBC QN AUTO: 25.5 PG (ref 27–34.8)
MCHC RBC AUTO-ENTMCNC: 31.6 G/DL (ref 32.4–35.8)
MICROCYTES BLD QL SMEAR: (no result)
MONOS#(MANUAL): 0.37 X10^3/UL (ref 0.3–2.7)
MONOS% (MANUAL): 11 % (ref 2–9)
OVALOCYTES BLD QL SMEAR: (no result)
PLATELET # BLD AUTO: 79 X10^3/UL (ref 130–400)
PMV BLD AUTO: 8.7 FL (ref 7.4–10.4)
PROT SERPL-MCNC: 6.1 G/DL (ref 6.4–8.2)
PROTHROMBIN TIME: 14.5 SECONDS (ref 9.6–11.5)
RBC # BLD AUTO: 4.39 X10^6/UL (ref 3.82–5.3)
SEG#(MANUAL): 2.58 X10^3/UL (ref 1.8–6.8)
SEGS% (MANUAL): 76 % (ref 42–75)
TEAR DROPS: (no result)
TROPONIN I SERPL-MCNC: < 0.015 NG/ML (ref 0–0.04)

## 2021-09-09 PROCEDURE — 85014 HEMATOCRIT: CPT

## 2021-09-09 PROCEDURE — 99285 EMERGENCY DEPT VISIT HI MDM: CPT

## 2021-09-09 PROCEDURE — 70450 CT HEAD/BRAIN W/O DYE: CPT

## 2021-09-09 PROCEDURE — 70498 CT ANGIOGRAPHY NECK: CPT

## 2021-09-09 PROCEDURE — 82140 ASSAY OF AMMONIA: CPT

## 2021-09-09 PROCEDURE — 85610 PROTHROMBIN TIME: CPT

## 2021-09-09 PROCEDURE — 83540 ASSAY OF IRON: CPT

## 2021-09-09 PROCEDURE — 84484 ASSAY OF TROPONIN QUANT: CPT

## 2021-09-09 PROCEDURE — 71045 X-RAY EXAM CHEST 1 VIEW: CPT

## 2021-09-09 PROCEDURE — C9113 INJ PANTOPRAZOLE SODIUM, VIA: HCPCS

## 2021-09-09 PROCEDURE — 84100 ASSAY OF PHOSPHORUS: CPT

## 2021-09-09 PROCEDURE — 36415 COLL VENOUS BLD VENIPUNCTURE: CPT

## 2021-09-09 PROCEDURE — 85018 HEMOGLOBIN: CPT

## 2021-09-09 PROCEDURE — 82272 OCCULT BLD FECES 1-3 TESTS: CPT

## 2021-09-09 PROCEDURE — 85025 COMPLETE CBC W/AUTO DIFF WBC: CPT

## 2021-09-09 PROCEDURE — 83550 IRON BINDING TEST: CPT

## 2021-09-09 PROCEDURE — 87086 URINE CULTURE/COLONY COUNT: CPT

## 2021-09-09 PROCEDURE — 93005 ELECTROCARDIOGRAM TRACING: CPT

## 2021-09-09 PROCEDURE — 81001 URINALYSIS AUTO W/SCOPE: CPT

## 2021-09-09 PROCEDURE — 84443 ASSAY THYROID STIM HORMONE: CPT

## 2021-09-09 PROCEDURE — 80053 COMPREHEN METABOLIC PANEL: CPT

## 2021-09-09 PROCEDURE — 70496 CT ANGIOGRAPHY HEAD: CPT

## 2021-09-09 PROCEDURE — 82728 ASSAY OF FERRITIN: CPT

## 2021-09-09 PROCEDURE — 83735 ASSAY OF MAGNESIUM: CPT

## 2021-09-09 RX ADMIN — Medication PRN MG: at 23:31

## 2021-09-09 RX ADMIN — LACTULOSE SCH GM: 10 SOLUTION ORAL at 23:31

## 2021-09-09 RX ADMIN — PANTOPRAZOLE SODIUM SCH MG: 40 INJECTION, POWDER, FOR SOLUTION INTRAVENOUS at 23:31

## 2021-09-10 VITALS — DIASTOLIC BLOOD PRESSURE: 73 MMHG | SYSTOLIC BLOOD PRESSURE: 115 MMHG

## 2021-09-10 VITALS — DIASTOLIC BLOOD PRESSURE: 77 MMHG | SYSTOLIC BLOOD PRESSURE: 125 MMHG

## 2021-09-10 VITALS — DIASTOLIC BLOOD PRESSURE: 73 MMHG | SYSTOLIC BLOOD PRESSURE: 112 MMHG

## 2021-09-10 VITALS — DIASTOLIC BLOOD PRESSURE: 70 MMHG | SYSTOLIC BLOOD PRESSURE: 110 MMHG

## 2021-09-10 LAB
<PLATELET ESTIMATE>: (no result)
<PLT MORPHOLOGY>: (no result)
ALBUMIN SERPL-MCNC: 2.4 G/DL (ref 3.4–5)
ALP SERPL-CCNC: 104 U/L (ref 45–117)
ALT SERPL-CCNC: 20 U/L (ref 12–78)
ANION GAP SERPL CALC-SCNC: 7 MMOL/L (ref 5–15)
ANISOCYTOSIS BLD QL SMEAR: (no result)
BASOPHILS # BLD AUTO: 0 X10^3/UL (ref 0–0.1)
BASOPHILS NFR BLD AUTO: 1 % (ref 0–1)
BILIRUB SERPL-MCNC: 1.6 MG/DL (ref 0.2–1)
CALCIUM SERPL-MCNC: 9.1 MG/DL (ref 8.5–10.1)
CHLORIDE SERPL-SCNC: 109 MMOL/L (ref 98–107)
CREAT SERPL-MCNC: 0.66 MG/DL (ref 0.55–1.02)
EOSINOPHIL # BLD AUTO: 0.1 X10^3/UL (ref 0–0.4)
EOSINOPHIL NFR BLD AUTO: 4 % (ref 1–7)
ERYTHROCYTE [DISTWIDTH] IN BLOOD BY AUTOMATED COUNT: 25.8 % (ref 9.6–15.2)
GFR SERPL CREATININE-BSD FRML MDRD: POSITIVE ML/MIN/{1.73_M2}
HYPOCHROMIA BLD QL SMEAR: (no result)
LYMPHOCYTES # BLD AUTO: 0.6 X10^3/UL (ref 1–3.4)
LYMPHOCYTES NFR BLD AUTO: 22 % (ref 22–44)
MCH RBC QN AUTO: 25.5 PG (ref 27–34.8)
MCHC RBC AUTO-ENTMCNC: 31.5 G/DL (ref 32.4–35.8)
MICROCYTES BLD QL SMEAR: (no result)
MICROSCOPIC: (no result)
MONOCYTES # BLD AUTO: 0.4 X10^3/UL (ref 0.2–0.8)
MONOCYTES NFR BLD AUTO: 15 % (ref 2–9)
NEUTROPHILS # BLD AUTO: 1.5 X10^3/UL (ref 1.8–6.8)
NEUTROPHILS NFR BLD AUTO: 57 % (ref 42–75)
OVALOCYTES BLD QL SMEAR: (no result)
PLATELET # BLD AUTO: 63 X10^3/UL (ref 130–400)
PMV BLD AUTO: 8.5 FL (ref 7.4–10.4)
PROT SERPL-MCNC: 5 G/DL (ref 6.4–8.2)
RBC # BLD AUTO: 3.79 X10^6/UL (ref 3.82–5.3)
TEAR DROPS: (no result)

## 2021-09-10 RX ADMIN — LACTULOSE SCH GM: 10 SOLUTION ORAL at 17:32

## 2021-09-10 RX ADMIN — PANTOPRAZOLE SODIUM SCH MG: 40 INJECTION, POWDER, FOR SOLUTION INTRAVENOUS at 21:31

## 2021-09-10 RX ADMIN — LACTULOSE SCH GM: 10 SOLUTION ORAL at 21:31

## 2021-09-10 RX ADMIN — PANTOPRAZOLE SODIUM SCH MG: 40 INJECTION, POWDER, FOR SOLUTION INTRAVENOUS at 09:24

## 2021-09-10 RX ADMIN — SUCRALFATE SCH GM: 1 TABLET ORAL at 21:31

## 2021-09-10 RX ADMIN — LACTULOSE SCH GM: 10 SOLUTION ORAL at 09:22

## 2021-09-11 VITALS — SYSTOLIC BLOOD PRESSURE: 100 MMHG | DIASTOLIC BLOOD PRESSURE: 64 MMHG

## 2021-09-11 VITALS — SYSTOLIC BLOOD PRESSURE: 105 MMHG | DIASTOLIC BLOOD PRESSURE: 71 MMHG

## 2021-09-11 VITALS — DIASTOLIC BLOOD PRESSURE: 64 MMHG | SYSTOLIC BLOOD PRESSURE: 103 MMHG

## 2021-09-11 LAB
% IRON SATURATION: 11 % (ref 20–55)
ALBUMIN SERPL-MCNC: 2.4 G/DL (ref 3.4–5)
ALP SERPL-CCNC: 109 U/L (ref 45–117)
ALT SERPL-CCNC: 20 U/L (ref 12–78)
ANION GAP SERPL CALC-SCNC: 8 MMOL/L (ref 5–15)
BASOPHILS # BLD AUTO: 0 X10^3/UL (ref 0–0.1)
BASOPHILS NFR BLD AUTO: 1 % (ref 0–1)
BILIRUB SERPL-MCNC: 1.2 MG/DL (ref 0.2–1)
CALCIUM SERPL-MCNC: 9.1 MG/DL (ref 8.5–10.1)
CHLORIDE SERPL-SCNC: 111 MMOL/L (ref 98–107)
CREAT SERPL-MCNC: 0.61 MG/DL (ref 0.55–1.02)
EOSINOPHIL # BLD AUTO: 0.1 X10^3/UL (ref 0–0.4)
EOSINOPHIL NFR BLD AUTO: 4 % (ref 1–7)
ERYTHROCYTE [DISTWIDTH] IN BLOOD BY AUTOMATED COUNT: 25.1 % (ref 9.6–15.2)
GFR SERPL CREATININE-BSD FRML MDRD: POSITIVE ML/MIN/{1.73_M2}
IRON LEVEL: 31 MCG/DL (ref 50–170)
LYMPHOCYTES # BLD AUTO: 0.4 X10^3/UL (ref 1–3.4)
LYMPHOCYTES NFR BLD AUTO: 17 % (ref 22–44)
MCH RBC QN AUTO: 25.7 PG (ref 27–34.8)
MCHC RBC AUTO-ENTMCNC: 31.6 G/DL (ref 32.4–35.8)
MONOCYTES # BLD AUTO: 0.3 X10^3/UL (ref 0.2–0.8)
MONOCYTES NFR BLD AUTO: 12 % (ref 2–9)
NEUTROPHILS # BLD AUTO: 1.7 X10^3/UL (ref 1.8–6.8)
NEUTROPHILS NFR BLD AUTO: 66 % (ref 42–75)
PLATELET # BLD AUTO: 57 X10^3/UL (ref 130–400)
PMV BLD AUTO: 8.7 FL (ref 7.4–10.4)
PROT SERPL-MCNC: 4.9 G/DL (ref 6.4–8.2)
RBC # BLD AUTO: 3.58 X10^6/UL (ref 3.82–5.3)
TIBC SERPL-MCNC: 272 MCG/DL (ref 250–450)

## 2021-09-11 RX ADMIN — PANTOPRAZOLE SODIUM SCH MG: 40 INJECTION, POWDER, FOR SOLUTION INTRAVENOUS at 08:20

## 2021-09-11 RX ADMIN — SUCRALFATE SCH GM: 1 TABLET ORAL at 16:25

## 2021-09-11 RX ADMIN — LACTULOSE SCH GM: 10 SOLUTION ORAL at 08:21

## 2021-09-11 RX ADMIN — LACTULOSE SCH GM: 10 SOLUTION ORAL at 16:25

## 2021-09-11 RX ADMIN — SUCRALFATE SCH GM: 1 TABLET ORAL at 08:19

## 2021-09-11 RX ADMIN — Medication PRN MG: at 00:30

## 2021-09-11 RX ADMIN — SUCRALFATE SCH GM: 1 TABLET ORAL at 12:19
